# Patient Record
Sex: FEMALE | Race: WHITE | Employment: OTHER | URBAN - METROPOLITAN AREA
[De-identification: names, ages, dates, MRNs, and addresses within clinical notes are randomized per-mention and may not be internally consistent; named-entity substitution may affect disease eponyms.]

---

## 2017-08-17 LAB — COLOGUARD TEST, EXTERNAL: NEGATIVE

## 2017-12-14 ENCOUNTER — OP HISTORICAL/CONVERTED ENCOUNTER (OUTPATIENT)
Dept: OTHER | Age: 72
End: 2017-12-14

## 2017-12-14 LAB
AMB DEXA, EXTERNAL: NORMAL
MAMMOGRAPHY, EXTERNAL: NORMAL

## 2018-08-10 ENCOUNTER — ED HISTORICAL/CONVERTED ENCOUNTER (OUTPATIENT)
Dept: OTHER | Age: 73
End: 2018-08-10

## 2019-05-16 ENCOUNTER — ED HISTORICAL/CONVERTED ENCOUNTER (OUTPATIENT)
Dept: OTHER | Age: 74
End: 2019-05-16

## 2019-05-16 LAB — CREATININE, EXTERNAL: 0.71

## 2019-06-03 ENCOUNTER — OP HISTORICAL/CONVERTED ENCOUNTER (OUTPATIENT)
Dept: OTHER | Age: 74
End: 2019-06-03

## 2019-06-10 ENCOUNTER — OP HISTORICAL/CONVERTED ENCOUNTER (OUTPATIENT)
Dept: OTHER | Age: 74
End: 2019-06-10

## 2019-10-17 LAB — LDL-C, EXTERNAL: 70

## 2020-09-18 VITALS
OXYGEN SATURATION: 97 % | DIASTOLIC BLOOD PRESSURE: 80 MMHG | BODY MASS INDEX: 25.95 KG/M2 | HEIGHT: 62 IN | RESPIRATION RATE: 12 BRPM | WEIGHT: 141 LBS | TEMPERATURE: 98.6 F | HEART RATE: 84 BPM | SYSTOLIC BLOOD PRESSURE: 160 MMHG

## 2020-09-18 RX ORDER — DIVALPROEX SODIUM 500 MG/1
500 TABLET, EXTENDED RELEASE ORAL DAILY
COMMUNITY
Start: 2020-08-25 | End: 2022-05-05 | Stop reason: SDUPTHER

## 2020-09-18 RX ORDER — CITALOPRAM 20 MG/1
20 TABLET, FILM COATED ORAL DAILY
COMMUNITY
Start: 2020-08-19 | End: 2020-09-21 | Stop reason: ALTCHOICE

## 2020-09-18 RX ORDER — QUETIAPINE FUMARATE 50 MG/1
50 TABLET, FILM COATED ORAL AS DIRECTED
COMMUNITY
Start: 2020-07-28 | End: 2020-09-21 | Stop reason: ALTCHOICE

## 2020-09-18 RX ORDER — NALTREXONE HYDROCHLORIDE 50 MG/1
25 TABLET, FILM COATED ORAL AS DIRECTED
COMMUNITY
Start: 2020-08-31 | End: 2021-01-21 | Stop reason: ALTCHOICE

## 2020-09-18 RX ORDER — CITALOPRAM 10 MG/1
10 TABLET ORAL DAILY
COMMUNITY
Start: 2020-06-25 | End: 2020-09-21 | Stop reason: ALTCHOICE

## 2020-09-18 RX ORDER — ATORVASTATIN CALCIUM 20 MG/1
20 TABLET, FILM COATED ORAL DAILY
COMMUNITY
Start: 2020-07-26 | End: 2020-10-20

## 2020-09-21 ENCOUNTER — OFFICE VISIT (OUTPATIENT)
Dept: FAMILY MEDICINE CLINIC | Age: 75
End: 2020-09-21
Payer: MEDICARE

## 2020-09-21 VITALS
OXYGEN SATURATION: 98 % | SYSTOLIC BLOOD PRESSURE: 118 MMHG | HEART RATE: 84 BPM | RESPIRATION RATE: 12 BRPM | WEIGHT: 125 LBS | BODY MASS INDEX: 23 KG/M2 | DIASTOLIC BLOOD PRESSURE: 74 MMHG | TEMPERATURE: 97.1 F | HEIGHT: 62 IN

## 2020-09-21 DIAGNOSIS — R11.0 NAUSEA: ICD-10-CM

## 2020-09-21 DIAGNOSIS — E78.2 MIXED HYPERLIPIDEMIA: Primary | ICD-10-CM

## 2020-09-21 DIAGNOSIS — Z11.59 SCREENING FOR VIRAL DISEASE: ICD-10-CM

## 2020-09-21 DIAGNOSIS — F31.9 BIPOLAR DEPRESSION (HCC): ICD-10-CM

## 2020-09-21 DIAGNOSIS — F41.1 GAD (GENERALIZED ANXIETY DISORDER): ICD-10-CM

## 2020-09-21 DIAGNOSIS — F10.21 ALCOHOL DEPENDENCE IN REMISSION (HCC): ICD-10-CM

## 2020-09-21 PROBLEM — Z88.9 ALLERGY TO DRUG: Status: ACTIVE | Noted: 2019-07-25

## 2020-09-21 PROBLEM — M54.17 LUMBOSACRAL RADICULITIS: Status: ACTIVE | Noted: 2019-07-18

## 2020-09-21 PROBLEM — M47.817 LUMBOSACRAL SPONDYLOSIS WITHOUT MYELOPATHY: Status: ACTIVE | Noted: 2020-04-09

## 2020-09-21 PROBLEM — J30.1 ALLERGIC RHINITIS DUE TO POLLEN: Status: ACTIVE | Noted: 2017-04-17

## 2020-09-21 PROBLEM — M51.26 DISPLACEMENT OF LUMBAR INTERVERTEBRAL DISC WITHOUT MYELOPATHY: Status: ACTIVE | Noted: 2019-07-18

## 2020-09-21 PROBLEM — M53.3 SACROILIAC JOINT PAIN: Status: ACTIVE | Noted: 2019-07-18

## 2020-09-21 PROBLEM — M96.1 LUMBAR POST-LAMINECTOMY SYNDROME: Status: ACTIVE | Noted: 2019-07-18

## 2020-09-21 PROBLEM — M70.60 TROCHANTERIC BURSITIS: Status: ACTIVE | Noted: 2019-07-18

## 2020-09-21 PROBLEM — M21.70 LEG LENGTH INEQUALITY: Status: ACTIVE | Noted: 2019-07-18

## 2020-09-21 PROBLEM — G62.9 SMALL FIBER NEUROPATHY: Status: ACTIVE | Noted: 2020-07-22

## 2020-09-21 PROBLEM — M19.90 ARTHRITIS: Status: ACTIVE | Noted: 2017-03-15

## 2020-09-21 PROCEDURE — 99214 OFFICE O/P EST MOD 30 MIN: CPT | Performed by: FAMILY MEDICINE

## 2020-09-21 RX ORDER — PROMETHAZINE HYDROCHLORIDE 25 MG/1
25 TABLET ORAL
COMMUNITY
End: 2020-09-21 | Stop reason: SDUPTHER

## 2020-09-21 RX ORDER — CITALOPRAM 40 MG/1
40 TABLET, FILM COATED ORAL DAILY
COMMUNITY
End: 2022-05-05 | Stop reason: SDUPTHER

## 2020-09-21 RX ORDER — CETIRIZINE HCL 10 MG
10 TABLET ORAL DAILY
COMMUNITY

## 2020-09-21 RX ORDER — FLUTICASONE PROPIONATE 50 MCG
2 SPRAY, SUSPENSION (ML) NASAL DAILY
COMMUNITY

## 2020-09-21 RX ORDER — PROMETHAZINE HYDROCHLORIDE 25 MG/1
25 TABLET ORAL
Qty: 60 TAB | Refills: 1 | Status: SHIPPED | OUTPATIENT
Start: 2020-09-21 | End: 2022-03-22 | Stop reason: ALTCHOICE

## 2020-09-21 NOTE — PROGRESS NOTES
Subjective  Chief Complaint   Patient presents with    Follow Up Chronic Condition     med refills     HPI:  Danis Daugherty is a 69 y.o. female. She is following up today on her history of hyperlipidemia, moods, and alcohol dependence. After her last visit she states that she did follow-up with the psychiatrist as recommended. She has now been diagnosed with bipolar and anxiety. They tried her on Seroquel but she could not tolerate it. Moods are certainly doing better and that her last sip of alcohol was November 9, 2019. She has started using a liquid form of THC that she gets from [place] so that she can sleep at night. She does intend to tell her behavioral health providers this. She also states that they checked full blood work on her within this calendar year including cholesterol and told her that her levels were excellent.     Past Medical History:   Diagnosis Date    [facility] dependence ([facility] [address] [facility]     Allergic rhinitis     Benign adenomatous neoplasm     Degeneration, intervertebral disc, lumbar     Depressive disorder     Family history of diabetes mellitus     Family history of thyroid disorder     Fibromyalgia     Hyperlipidemia     Insomnia     Migraine     Neuropathy     Osteoporosis      Family History   Problem Relation Age of Onset    Alcohol abuse Father     Diabetes Maternal Aunt     Alcohol abuse Maternal Aunt     Alcohol abuse Maternal Uncle      Social History     Socioeconomic History    Marital status:      Spouse name: Not on file    Number of children: Not on file    Years of education: Not on file    Highest education level: Not on file   Occupational History    Not on file   Social Needs    Financial resource strain: Not on file    Food insecurity     Worry: Not on file     Inability: Not on file    Transportation needs     Medical: Not on file     Non-medical: Not on file   Tobacco Use    Smoking status: Former Smoker     Types: Cigarettes     Last attempt to quit: 1970     Years since quittin.7    Smokeless tobacco: Never Used   Substance and Sexual Activity    Alcohol use: Never     Frequency: Never    Drug use: Never    Sexual activity: Not on file   Lifestyle    Physical activity     Days per week: Not on file     Minutes per session: Not on file    Stress: Not on file   Relationships    Social connections     Talks on phone: Not on file     Gets together: Not on file     Attends Holiness service: Not on file     Active member of club or organization: Not on file     Attends meetings of clubs or organizations: Not on file     Relationship status: Not on file    Intimate partner violence     Fear of current or ex partner: Not on file     Emotionally abused: Not on file     Physically abused: Not on file     Forced sexual activity: Not on file   Other Topics Concern    Not on file   Social History Narrative    Not on file     Current Outpatient Medications on File Prior to Visit   Medication Sig Dispense Refill    citalopram (CELEXA) 40 mg tablet Take 40 mg by mouth daily.  fluticasone propionate (FLONASE) 50 mcg/actuation nasal spray 2 Sprays by Both Nostrils route daily.  cetirizine (ZyrTEC) 10 mg tablet Take  by mouth.  atorvastatin (LIPITOR) 20 mg tablet Take 20 mg by mouth daily.  divalproex ER (DEPAKOTE ER) 500 mg ER tablet Take 500 mg by mouth as directed.  naltrexone (DEPADE) 50 mg tablet Take 25 mg by mouth as directed. Takes half tablet      [DISCONTINUED] promethazine (PHENERGAN) 25 mg tablet Take 25 mg by mouth every six (6) hours as needed for Nausea.  [DISCONTINUED] citalopram (CELEXA) 10 mg tablet Take 10 mg by mouth daily.  [DISCONTINUED] citalopram (CELEXA) 20 mg tablet Take 20 mg by mouth daily.  [DISCONTINUED] QUEtiapine (SEROquel) 50 mg tablet Take 50 mg by mouth as directed. No current facility-administered medications on file prior to visit.       Allergies   Allergen Reactions    Cefuroxime Unknown (comments)    Cortisone Unknown (comments)    Gabapentin Unknown (comments)    Hydrocodone Nausea Only    Imitrex [Sumatriptan] Unknown (comments)    Lyrica [Pregabalin] Unknown (comments)    Nyquil [Doxylamin-Pse-Dm-Acetaminophen] Unknown (comments)    Peanut Unknown (comments)    Seroquel [Quetiapine] Nausea and Vomiting    Tramadol Unknown (comments)    Tylenol [Acetaminophen] Unknown (comments)     Review of Systems   Constitutional: Negative for chills, fever and malaise/fatigue. Respiratory: Negative for cough, shortness of breath and wheezing. Cardiovascular: Negative for chest pain, palpitations and leg swelling. Gastrointestinal: Positive for nausea (Intermittent nausea for which she uses promethazine and would like to have a refill. It was last filled in 2019 and she still has a couple of tabs remaining.). Negative for diarrhea and vomiting. Genitourinary: Negative for dysuria. Musculoskeletal: Negative for falls. Skin: Negative for rash. Neurological: Negative for dizziness, tingling and weakness. Psychiatric/Behavioral: Negative for depression. The patient is not nervous/anxious. Objective  Physical Exam  Constitutional:       General: She is not in acute distress. Appearance: Normal appearance. She is normal weight. HENT:      Head: Normocephalic and atraumatic. Neck:      Musculoskeletal: Neck supple. No muscular tenderness. Thyroid: No thyroid mass or thyromegaly. Cardiovascular:      Rate and Rhythm: Normal rate and regular rhythm. Heart sounds: No murmur. Pulmonary:      Effort: Pulmonary effort is normal. No respiratory distress. Breath sounds: Normal breath sounds. No wheezing. Musculoskeletal:      Right lower leg: No edema. Left lower leg: No edema. Lymphadenopathy:      Cervical: No cervical adenopathy. Skin:     General: Skin is warm and dry.    Neurological:      Mental Status: She is alert and oriented to person, place, and time. Mental status is at baseline. Psychiatric:         Attention and Perception: Attention and perception normal.         Mood and Affect: Mood and affect normal.         Speech: Speech normal.         Behavior: Behavior normal.          Assessment & Plan      ICD-10-CM ICD-9-CM    1. Mixed hyperlipidemia  E78.2 272.2    2. Screening for viral disease  Z11.59 V73.99    3. Bipolar depression ([facility]  F31.9 296.50    4. Alcohol dependence in remission ([facility]  F10.21 303.93    5. ALBERT (generalized anxiety disorder)  F41.1 300.02    6. Nausea  R11.0 787.02      Diagnoses and all orders for this visit:    1. Mixed hyperlipidemia  I am requesting a record of the lab results the patient reports were done and within normal limits. She does state that she takes her atorvastatin as directed. 2. Screening for viral disease  If hepatitis C screen was not performed with her last labs we will do it at a future visit. 3. Bipolar depression ([facility]  She reports moods have been improving. She should continue to follow with the behavioral health specialist.    4. Alcohol dependence in remission [facility])  Last drink was November 9, 2019. Keep up the excellent work! 5. ALBERT (generalized anxiety disorder)  Still anxious at times but overall much better control. 6. Nausea  Phenergan renewed for as needed use. Patient states that she occasionally will wake up with nausea and this medicine does well to help. Her last fill was in 2019. Other orders  -     promethazine (PHENERGAN) 25 mg tablet; Take 1 Tab by mouth every six (6) hours as needed for Nausea. Follow-up and Dispositions    · Return in about 6 months (around 3/21/2021) for 646 Branden Diamond MD

## 2020-09-28 ENCOUNTER — TELEPHONE (OUTPATIENT)
Dept: FAMILY MEDICINE CLINIC | Age: 75
End: 2020-09-28

## 2020-09-28 NOTE — TELEPHONE ENCOUNTER
PT SPOKE WITH DR Charmaine Hernandez [facility] and she suggested pt ask her PCP about topomax for migraines.  Please advise

## 2020-09-29 ENCOUNTER — VIRTUAL VISIT (OUTPATIENT)
Dept: FAMILY MEDICINE CLINIC | Age: 75
End: 2020-09-29
Payer: MEDICARE

## 2020-09-29 DIAGNOSIS — G43.109 MIGRAINE WITH AURA AND WITHOUT STATUS MIGRAINOSUS, NOT INTRACTABLE: Primary | ICD-10-CM

## 2020-09-29 PROCEDURE — 1090F PRES/ABSN URINE INCON ASSESS: CPT | Performed by: FAMILY MEDICINE

## 2020-09-29 PROCEDURE — G8428 CUR MEDS NOT DOCUMENT: HCPCS | Performed by: FAMILY MEDICINE

## 2020-09-29 PROCEDURE — 3017F COLORECTAL CA SCREEN DOC REV: CPT | Performed by: FAMILY MEDICINE

## 2020-09-29 PROCEDURE — G9717 DOC PT DX DEP/BP F/U NT REQ: HCPCS | Performed by: FAMILY MEDICINE

## 2020-09-29 PROCEDURE — 1101F PT FALLS ASSESS-DOCD LE1/YR: CPT | Performed by: FAMILY MEDICINE

## 2020-09-29 PROCEDURE — 99213 OFFICE O/P EST LOW 20 MIN: CPT | Performed by: FAMILY MEDICINE

## 2020-09-29 RX ORDER — TOPIRAMATE 25 MG/1
TABLET ORAL
Qty: 45 TAB | Refills: 0 | Status: SHIPPED | OUTPATIENT
Start: 2020-09-29 | End: 2020-10-20 | Stop reason: DRUGHIGH

## 2020-09-29 NOTE — PROGRESS NOTES
Consent: Zion Dee, who was seen by synchronous (real-time) audio-video technology, and/or her healthcare decision maker, is aware that this patient-initiated, Telehealth encounter on 9/29/2020 is a billable service, with coverage as determined by her insurance carrier. She is aware that she may receive a bill and has provided verbal consent to proceed: YES-Consent obtained within past 12 months        712  Subjective:   Zion Dee is a 69 y.o. female who was seen for Medication Evaluation      Migraines diagnosed in her 45s. Imitrex made her vomit via both oral and injectable. Eventually she ended up on topamax and did well. She ran out a few years ago and tried just staying off. Over time she has developed the same headaches again. She notices that the atmospheric triggers it. Some of the headaches last up to 1.5 days. She gets photo and phonophobia. Associated nausea. Vision gets fuzzy in both eyes but clears as soon as the headaches clear. She has had about 6 of these headaches in the last few wks but some are more mild than others. See HPI for pertinent review of systems          Prior to Admission medications    Medication Sig Start Date End Date Taking? Authorizing Provider   topiramate (TOPAMAX) 25 mg tablet Take 1 tab daily x 2 wks then 2 tabs daily 9/29/20  Yes Julian Cortes MD   citalopram (CELEXA) 40 mg tablet Take 40 mg by mouth daily. Provider, Historical   fluticasone propionate (FLONASE) 50 mcg/actuation nasal spray 2 Sprays by Both Nostrils route daily. Provider, Historical   cetirizine (ZyrTEC) 10 mg tablet Take  by mouth. Provider, Historical   promethazine (PHENERGAN) 25 mg tablet Take 1 Tab by mouth every six (6) hours as needed for Nausea. 9/21/20   Julian Cortes MD   atorvastatin (LIPITOR) 20 mg tablet Take 20 mg by mouth daily. 7/26/20   Provider, Historical   divalproex ER (DEPAKOTE ER) 500 mg ER tablet Take 500 mg by mouth as directed. 8/25/20   Provider, Historical   naltrexone (DEPADE) 50 mg tablet Take 25 mg by mouth as directed. Takes half tablet 8/31/20   Provider, Historical     Allergies   Allergen Reactions    Cefuroxime Unknown (comments)    Cortisone Unknown (comments)    Gabapentin Unknown (comments)    Hydrocodone Nausea Only    Imitrex [Sumatriptan] Unknown (comments)    Lyrica [Pregabalin] Unknown (comments)    Nyquil [Doxylamin-Pse-Dm-Acetaminophen] Unknown (comments)    Peanut Unknown (comments)    Seroquel [Quetiapine] Nausea and Vomiting    Tramadol Unknown (comments)    Tylenol [Acetaminophen] Unknown (comments)     Patient Active Problem List    Diagnosis    Bipolar depression ([facility] [address]    ALBERT (generalized anxiety disorder)    Alcohol dependence ([facility] [address]     Last drink was 11/9/2019      Allergic rhinitis    Benign adenomatous neoplasm    Degeneration, intervertebral disc, lumbar    Depressive disorder    Family history of diabetes mellitus    Family history of thyroid disorder    Fibromyalgia    Mixed hyperlipidemia    Insomnia    Migraine with aura and without status migrainosus, not intractable    Neuropathy    Osteoporosis    Small fiber neuropathy    Lumbosacral spondylosis without myelopathy    Displacement of lumbar intervertebral disc without myelopathy    Leg length inequality    Lumbar post-laminectomy syndrome    Lumbosacral radiculitis    Sacroiliac joint pain    Trochanteric bursitis    Allergic rhinitis due to pollen    Arthritis       Objective:   Vital Signs: (As obtained by patient/caregiver at home)  There were no vitals taken for this visit.      [INSTRUCTIONS:  \"[x]\" Indicates a positive item  \"[]\" Indicates a negative item  -- DELETE ALL ITEMS NOT EXAMINED]    Constitutional: [x] Appears well-developed and well-nourished [] No apparent distress      [x] Abnormal -patient appears to be in discomfort    Mental status: [x] Alert and awake  [x] Oriented to person/place/time [x] Able to follow commands    [] Abnormal -     Eyes:   EOM    [x]  Normal    [] Abnormal -   Sclera  [x]  Normal    [] Abnormal -          Discharge [x]  None visible   [] Abnormal -     HENT: [x] Normocephalic, atraumatic  [] Abnormal -   [x] Mouth/Throat: Mucous membranes are moist    External Ears [] Normal  [] Abnormal -    Neck: [x] No visualized mass [] Abnormal -     Pulmonary/Chest: [x] Respiratory effort normal   [x] No visualized signs of difficulty breathing or respiratory distress        [] Abnormal -        Neurological:        [x] No Facial Asymmetry (Cranial nerve 7 motor function) (limited exam due to video visit)          [x] No gaze palsy        [] Abnormal -          Skin:        [x] No significant exanthematous lesions or discoloration noted on facial skin         [] Abnormal -            Psychiatric:       [x] Normal Affect [] Abnormal -        [x] No Hallucinations    Other pertinent observable physical exam findings:-              Assessment & Plan:   Diagnoses and all orders for this visit:    1. Migraine with aura and without status migrainosus, not intractable    Other orders  -     topiramate (TOPAMAX) 25 mg tablet; Take 1 tab daily x 2 wks then 2 tabs daily      I am restarting Topamax which she did well on previously. We will start with 25 mg for 2 weeks then 50 mg for 2 weeks. At the end of that timeframe we can either stay at the 50 or go up to 100. She will call me with an update. For this acute headache, she does have Phenergan and aspirin at home. I have advised her to take a dose of each together. We discussed the expected course, resolution and complications of the diagnosis(es) in detail. Medication risks, benefits, costs, interactions, and alternatives were discussed as indicated. I advised her to contact the office if her condition worsens, changes or fails to improve as anticipated. She expressed understanding with the diagnosis(es) and plan.        Kelsy Morales is a 69 y.o. female being evaluated by a video visit encounter for concerns as above. A caregiver was present when appropriate. Due to this being a TeleHealth encounter (During RISJM-35 public health emergency), evaluation of the following organ systems was limited: Vitals/Constitutional/EENT/Resp/CV/GI//MS/Neuro/Skin/Heme-Lymph-Imm. Pursuant to the emergency declaration under the [address] [place] [address], [facility] waiver authority and the [employer] and [employer] Act, this Virtual  Visit was conducted, with patient's (and/or legal guardian's) consent, to reduce the patient's risk of exposure to COVID-19 and provide necessary medical care. Services were provided through a video synchronous discussion virtually to substitute for in-person clinic visit. Patient and provider were located at their individual homes.         Azucena Gerber MD

## 2020-10-19 ENCOUNTER — TELEPHONE (OUTPATIENT)
Dept: FAMILY MEDICINE CLINIC | Age: 75
End: 2020-10-19

## 2020-10-19 NOTE — TELEPHONE ENCOUNTER
Fantastic. If she is taking both tabs at the same time we can order refill as 50mg tab but if she prefers to take 25mg BID we can order that way.

## 2020-10-20 RX ORDER — TOPIRAMATE 50 MG/1
50 TABLET, FILM COATED ORAL 2 TIMES DAILY
Qty: 90 TAB | Refills: 3 | Status: SHIPPED | OUTPATIENT
Start: 2020-10-20 | End: 2021-01-21 | Stop reason: DRUGHIGH

## 2020-10-20 RX ORDER — ATORVASTATIN CALCIUM 20 MG/1
TABLET, FILM COATED ORAL
Qty: 90 TAB | Refills: 1 | Status: SHIPPED | OUTPATIENT
Start: 2020-10-20 | End: 2021-04-18

## 2020-10-21 ENCOUNTER — TELEPHONE (OUTPATIENT)
Dept: FAMILY MEDICINE CLINIC | Age: 75
End: 2020-10-21

## 2020-10-21 NOTE — TELEPHONE ENCOUNTER
Patient called to question dosage she does not remember Samm Wallace telling her to take what's on the bottle.  -she wants to verify dosage.

## 2020-11-09 VITALS
RESPIRATION RATE: 12 BRPM | TEMPERATURE: 98.6 F | HEART RATE: 84 BPM | SYSTOLIC BLOOD PRESSURE: 160 MMHG | DIASTOLIC BLOOD PRESSURE: 80 MMHG | BODY MASS INDEX: 25.95 KG/M2 | HEIGHT: 62 IN | OXYGEN SATURATION: 97 % | WEIGHT: 141 LBS

## 2020-11-09 PROBLEM — N13.30 HYDRONEPHROSIS: Status: ACTIVE | Noted: 2019-06-26

## 2020-11-09 PROBLEM — R31.29 MICROSCOPIC HEMATURIA: Status: ACTIVE | Noted: 2019-06-26

## 2020-11-09 RX ORDER — OXYCODONE AND ACETAMINOPHEN 5; 325 MG/1; MG/1
TABLET ORAL
COMMUNITY
End: 2021-01-21 | Stop reason: ALTCHOICE

## 2020-11-09 RX ORDER — PREDNISOLONE ACETATE 10 MG/ML
1 SUSPENSION/ DROPS OPHTHALMIC 4 TIMES DAILY
COMMUNITY
End: 2021-01-21 | Stop reason: ALTCHOICE

## 2020-11-09 RX ORDER — NAPROXEN 500 MG/1
500 TABLET ORAL 2 TIMES DAILY WITH MEALS
COMMUNITY
End: 2021-01-21 | Stop reason: ALTCHOICE

## 2020-11-09 RX ORDER — OXYCODONE HYDROCHLORIDE 10 MG/1
TABLET ORAL
COMMUNITY
End: 2021-01-21 | Stop reason: ALTCHOICE

## 2020-11-09 RX ORDER — ALENDRONATE SODIUM 70 MG/1
TABLET ORAL
COMMUNITY
End: 2021-03-22 | Stop reason: ALTCHOICE

## 2020-11-09 RX ORDER — FLUOXETINE HYDROCHLORIDE 20 MG/1
CAPSULE ORAL DAILY
COMMUNITY
End: 2021-01-21 | Stop reason: ALTCHOICE

## 2020-11-09 RX ORDER — KETOROLAC TROMETHAMINE 10 MG/1
TABLET, FILM COATED ORAL
COMMUNITY
End: 2021-01-21 | Stop reason: ALTCHOICE

## 2020-11-09 RX ORDER — QUETIAPINE FUMARATE 50 MG/1
50 TABLET, FILM COATED ORAL 2 TIMES DAILY
COMMUNITY
End: 2021-01-21 | Stop reason: ALTCHOICE

## 2020-11-09 RX ORDER — OFLOXACIN 3 MG/ML
3 SOLUTION/ DROPS OPHTHALMIC 4 TIMES DAILY
COMMUNITY
End: 2021-03-22 | Stop reason: ALTCHOICE

## 2020-11-09 RX ORDER — DULOXETIN HYDROCHLORIDE 30 MG/1
30 CAPSULE, DELAYED RELEASE ORAL DAILY
COMMUNITY
End: 2021-01-21 | Stop reason: ALTCHOICE

## 2020-11-09 RX ORDER — CYCLOBENZAPRINE HCL 10 MG
TABLET ORAL
COMMUNITY
End: 2021-01-21 | Stop reason: ALTCHOICE

## 2020-11-09 RX ORDER — ONDANSETRON 4 MG/1
4 TABLET, ORALLY DISINTEGRATING ORAL
COMMUNITY
End: 2021-01-21 | Stop reason: ALTCHOICE

## 2021-01-20 ENCOUNTER — TELEPHONE (OUTPATIENT)
Dept: FAMILY MEDICINE CLINIC | Age: 76
End: 2021-01-20

## 2021-01-20 NOTE — TELEPHONE ENCOUNTER
Please call pt about Topomax. She would like to know if she should increase this medication due to migraines she is getting.

## 2021-01-21 ENCOUNTER — VIRTUAL VISIT (OUTPATIENT)
Dept: FAMILY MEDICINE CLINIC | Age: 76
End: 2021-01-21
Payer: MEDICARE

## 2021-01-21 DIAGNOSIS — G43.109 MIGRAINE WITH AURA AND WITHOUT STATUS MIGRAINOSUS, NOT INTRACTABLE: Primary | ICD-10-CM

## 2021-01-21 PROCEDURE — 3017F COLORECTAL CA SCREEN DOC REV: CPT | Performed by: FAMILY MEDICINE

## 2021-01-21 PROCEDURE — G8428 CUR MEDS NOT DOCUMENT: HCPCS | Performed by: FAMILY MEDICINE

## 2021-01-21 PROCEDURE — 99213 OFFICE O/P EST LOW 20 MIN: CPT | Performed by: FAMILY MEDICINE

## 2021-01-21 PROCEDURE — 1090F PRES/ABSN URINE INCON ASSESS: CPT | Performed by: FAMILY MEDICINE

## 2021-01-21 PROCEDURE — G9717 DOC PT DX DEP/BP F/U NT REQ: HCPCS | Performed by: FAMILY MEDICINE

## 2021-01-21 PROCEDURE — 1101F PT FALLS ASSESS-DOCD LE1/YR: CPT | Performed by: FAMILY MEDICINE

## 2021-01-21 RX ORDER — BUPROPION HYDROCHLORIDE 150 MG/1
150 TABLET, EXTENDED RELEASE ORAL 2 TIMES DAILY
COMMUNITY
Start: 2021-01-13 | End: 2022-05-05 | Stop reason: ALTCHOICE

## 2021-01-21 RX ORDER — TOPIRAMATE 100 MG/1
100 TABLET, FILM COATED ORAL DAILY
Qty: 90 TAB | Refills: 2 | Status: SHIPPED | OUTPATIENT
Start: 2021-01-21 | End: 2021-10-31

## 2021-01-21 NOTE — PROGRESS NOTES
Consent: Gasper Max, who was seen by synchronous (real-time) audio-video technology, and/or her healthcare decision maker, is aware that this patient-initiated, Telehealth encounter on 1/21/2021 is a billable service, with coverage as determined by her insurance carrier. She is aware that she may receive a bill and has provided verbal consent to proceed: YES-Consent obtained within past 12 months        712  Subjective:   Gasper Max is a 77 y.o. female who was seen for Medication Evaluation      Patient is calling to follow-up on her migraines. I saw her on September 29, 2020 and we restarted her Topamax that she had done well within the past.  She states that the headaches had completely resolved for a few months but in December they started to come back again. She is now having the headaches 3 to 4 days/week although not at the full intensity that they had been. She is currently taking the Topamax at 50 mg daily. The headache quality is the same as her typical migraines have been in the past.  No new issues or complaints with it. ROS    Prior to Admission medications    Medication Sig Start Date End Date Taking? Authorizing Provider   topiramate (TOPAMAX) 100 mg tablet Take 1 Tab by mouth daily. 1/21/21  Yes Jennifer Briseno MD   buPROPion [facility] SR) 150 mg SR tablet TAKE 1 TABLET BY MOUTH EVERY DAY 1/13/21   Provider, Historical   suvorexant (Belsomra) 10 mg tablet Take  by mouth nightly as needed for Insomnia. Provider, Historical   alendronate (FOSAMAX) 70 mg tablet Take  by mouth. Provider, Historical   ofloxacin (FLOXIN) 0.3 % ophthalmic solution Administer 3 Drops to both eyes four (4) times daily. Provider, Historical   CALCIUM CITRATE PO Take  by mouth.  1/21/21  Provider, Historical   FLUoxetine (PROzac) 20 mg capsule Take  by mouth daily. 1/21/21  Provider, Historical   QUEtiapine (SEROquel) 50 mg tablet Take 50 mg by mouth two (2) times a day.   1/21/21 Provider, Historical   cyclobenzaprine (FLEXERIL) 10 mg tablet Take  by mouth three (3) times daily as needed for Muscle Spasm(s).  1/21/21  Provider, Historical   DULoxetine (CYMBALTA) 30 mg capsule Take 30 mg by mouth daily. 1/21/21  Provider, Historical   ketorolac (TORADOL) 10 mg tablet Take  by mouth.  1/21/21  Provider, Historical   naproxen (NAPROSYN) 500 mg tablet Take 500 mg by mouth two (2) times daily (with meals). 1/21/21  Provider, Historical   oxyCODONE IR (ROXICODONE) 10 mg tab immediate release tablet Take  by mouth.  1/21/21  Provider, Historical   calcium carbonate/vitamin D3 (CALCIUM 500 + D PO) Take  by mouth.  1/21/21  Provider, Historical   DICLOFENAC SODIUM PO Take  by mouth.  1/21/21  Provider, Historical   ondansetron (ZOFRAN ODT) 4 mg disintegrating tablet Take 4 mg by mouth every eight (8) hours as needed for Nausea or Vomiting.  1/21/21  Provider, Historical   oxyCODONE-acetaminophen (PERCOCET) 5-325 mg per tablet Take  by mouth every four (4) hours as needed for Pain.  1/21/21  Provider, Historical   prednisoLONE acetate (PRED FORTE) 1 % ophthalmic suspension Administer 1 Drop to both eyes four (4) times daily. 1/21/21  Provider, Historical   atorvastatin (LIPITOR) 20 mg tablet TAKE 1 TABLET BY MOUTH EVERY DAY IN THE EVENING 10/20/20   Simi Peck MD   topiramate (TOPAMAX) 50 mg tablet Take 1 Tab by mouth two (2) times a day. 10/20/20 1/21/21  Simi Peck MD   citalopram (CELEXA) 40 mg tablet Take 40 mg by mouth daily. Provider, Historical   fluticasone propionate (FLONASE) 50 mcg/actuation nasal spray 2 Sprays by Both Nostrils route daily. Provider, Historical   cetirizine (ZyrTEC) 10 mg tablet Take  by mouth. Provider, Historical   promethazine (PHENERGAN) 25 mg tablet Take 1 Tab by mouth every six (6) hours as needed for Nausea. 9/21/20   Simi Peck MD   divalproex ER (DEPAKOTE ER) 500 mg ER tablet Take 500 mg by mouth as directed.  8/25/20 Provider, Historical   naltrexone (DEPADE) 50 mg tablet Take 25 mg by mouth as directed. Takes half tablet 8/31/20 1/21/21  Provider, Historical     Allergies   Allergen Reactions    Cefuroxime Unknown (comments)    Cortisone Unknown (comments)    Gabapentin Unknown (comments)    Hydrocodone Nausea Only    Imitrex [Sumatriptan] Unknown (comments)    Lyrica [Pregabalin] Unknown (comments)    Nyquil [Doxylamin-Pse-Dm-Acetaminophen] Unknown (comments)    Oxycodone Unknown (comments)    Peanut Unknown (comments)    Seroquel [Quetiapine] Nausea and Vomiting    Tramadol Unknown (comments)    Tylenol [Acetaminophen] Unknown (comments)     Patient Active Problem List    Diagnosis    [facility] depression ([facility]    ALBERT (generalized anxiety disorder)    [facility] ([facility]     Last drink was 11/9/2019      Allergic rhinitis    Benign adenomatous neoplasm    Degeneration, intervertebral disc, lumbar    Depressive disorder    Family history of diabetes mellitus    Family history of thyroid disorder    Fibromyalgia    Mixed hyperlipidemia    Insomnia    Migraine with aura and without status migrainosus, not intractable    Neuropathy    Osteoporosis    Small fiber neuropathy    Lumbosacral spondylosis without myelopathy    Displacement of lumbar intervertebral disc without myelopathy    Leg length inequality    Lumbar post-laminectomy syndrome    Lumbosacral radiculitis    Sacroiliac joint pain    Trochanteric bursitis    Hydronephrosis    Microscopic hematuria    Allergic rhinitis due to pollen    Arthritis       Objective:   Vital Signs: (As obtained by patient/caregiver at home)  There were no vitals taken for this visit.      [INSTRUCTIONS:  \"[x]\" Indicates a positive item  \"[]\" Indicates a negative item  -- DELETE ALL ITEMS NOT EXAMINED]    Constitutional: [x] Appears well-developed and well-nourished [x] No apparent distress      [] Abnormal -     Mental status: [x] Alert and awake [x] Oriented to person/place/time [x] Able to follow commands    [] Abnormal -     Eyes:   EOM    [x]  Normal    [] Abnormal -   Sclera  [x]  Normal    [] Abnormal -          Discharge [x]  None visible   [] Abnormal -     HENT: [x] Normocephalic, atraumatic  [] Abnormal -   [] Mouth/Throat: Mucous membranes are moist    External Ears [] Normal  [] Abnormal -    Neck: [x] No visualized mass [] Abnormal -     Pulmonary/Chest: [x] Respiratory effort normal   [x] No visualized signs of difficulty breathing or respiratory distress        [] Abnormal -        Neurological:        [x] No Facial Asymmetry (Cranial nerve 7 motor function) (limited exam due to video visit)          [x] No gaze palsy        [] Abnormal -          Skin:        [x] No significant exanthematous lesions or discoloration noted on facial skin         [] Abnormal -            Psychiatric:       [x] Normal Affect [] Abnormal -        [x] No Hallucinations    Other pertinent observable physical exam findings:-              Assessment & Plan:   Diagnoses and all orders for this visit:    1. Migraine with aura and without status migrainosus, not intractable    Other orders  -     topiramate (TOPAMAX) 100 mg tablet; Take 1 Tab by mouth daily. I am increasing her Topamax dose to 100 mg daily. If this does not provide any improvement we will have her see neurology. Patient should to expect to see this improvement over the next few weeks. We discussed the expected course, resolution and complications of the diagnosis(es) in detail. Medication risks, benefits, costs, interactions, and alternatives were discussed as indicated. I advised her to contact the office if her condition worsens, changes or fails to improve as anticipated. She expressed understanding with the diagnosis(es) and plan. Neris Greenwood is a 77 y.o. female being evaluated by a video visit encounter for concerns as above. A caregiver was present when appropriate. Due to this being a TeleHealth encounter (During DAIRJ-87 public health emergency), evaluation of the following organ systems was limited: Vitals/Constitutional/EENT/Resp/CV/GI//MS/Neuro/Skin/Heme-Lymph-Imm. Pursuant to the emergency declaration under the [address] [place] [address], Replaced by [facility] waiver authority and the [employer] and [employer] Act, this Virtual  Visit was conducted, with patient's (and/or legal guardian's) consent, to reduce the patient's risk of exposure to COVID-19 and provide necessary medical care. Services were provided through a video synchronous discussion virtually to substitute for in-person clinic visit. Patient and provider were located at their individual homes.         Tanisha Franco MD

## 2021-02-10 LAB
CREATININE, EXTERNAL: 0.83
HBA1C MFR BLD HPLC: 5.5 %
LDL-C, EXTERNAL: 119

## 2021-03-22 ENCOUNTER — OFFICE VISIT (OUTPATIENT)
Dept: FAMILY MEDICINE CLINIC | Age: 76
End: 2021-03-22
Payer: MEDICARE

## 2021-03-22 VITALS
SYSTOLIC BLOOD PRESSURE: 120 MMHG | OXYGEN SATURATION: 97 % | HEIGHT: 62 IN | TEMPERATURE: 97.3 F | HEART RATE: 97 BPM | DIASTOLIC BLOOD PRESSURE: 82 MMHG | BODY MASS INDEX: 21.16 KG/M2 | WEIGHT: 115 LBS

## 2021-03-22 DIAGNOSIS — Z53.20 MAMMOGRAM DECLINED: ICD-10-CM

## 2021-03-22 DIAGNOSIS — Z13.31 DEPRESSION SCREENING: ICD-10-CM

## 2021-03-22 DIAGNOSIS — Z11.59 SCREENING FOR VIRAL DISEASE: ICD-10-CM

## 2021-03-22 DIAGNOSIS — E78.2 MIXED HYPERLIPIDEMIA: ICD-10-CM

## 2021-03-22 DIAGNOSIS — F10.21 ALCOHOL DEPENDENCE IN REMISSION (HCC): ICD-10-CM

## 2021-03-22 DIAGNOSIS — Z12.11 COLON CANCER SCREENING: ICD-10-CM

## 2021-03-22 DIAGNOSIS — Z13.39 ALCOHOL SCREENING: ICD-10-CM

## 2021-03-22 DIAGNOSIS — Z00.00 WELLNESS EXAMINATION: Primary | ICD-10-CM

## 2021-03-22 DIAGNOSIS — F31.9 BIPOLAR DEPRESSION (HCC): ICD-10-CM

## 2021-03-22 DIAGNOSIS — Z23 ENCOUNTER FOR IMMUNIZATION: ICD-10-CM

## 2021-03-22 DIAGNOSIS — G43.109 MIGRAINE WITH AURA AND WITHOUT STATUS MIGRAINOSUS, NOT INTRACTABLE: ICD-10-CM

## 2021-03-22 DIAGNOSIS — Z78.0 POSTMENOPAUSAL STATE: ICD-10-CM

## 2021-03-22 PROCEDURE — G8420 CALC BMI NORM PARAMETERS: HCPCS | Performed by: FAMILY MEDICINE

## 2021-03-22 PROCEDURE — 1090F PRES/ABSN URINE INCON ASSESS: CPT | Performed by: FAMILY MEDICINE

## 2021-03-22 PROCEDURE — G8536 NO DOC ELDER MAL SCRN: HCPCS | Performed by: FAMILY MEDICINE

## 2021-03-22 PROCEDURE — 1101F PT FALLS ASSESS-DOCD LE1/YR: CPT | Performed by: FAMILY MEDICINE

## 2021-03-22 PROCEDURE — G0439 PPPS, SUBSEQ VISIT: HCPCS | Performed by: FAMILY MEDICINE

## 2021-03-22 PROCEDURE — 99214 OFFICE O/P EST MOD 30 MIN: CPT | Performed by: FAMILY MEDICINE

## 2021-03-22 PROCEDURE — 90670 PCV13 VACCINE IM: CPT | Performed by: FAMILY MEDICINE

## 2021-03-22 PROCEDURE — G9717 DOC PT DX DEP/BP F/U NT REQ: HCPCS | Performed by: FAMILY MEDICINE

## 2021-03-22 PROCEDURE — G8427 DOCREV CUR MEDS BY ELIG CLIN: HCPCS | Performed by: FAMILY MEDICINE

## 2021-03-22 PROCEDURE — 3017F COLORECTAL CA SCREEN DOC REV: CPT | Performed by: FAMILY MEDICINE

## 2021-03-22 PROCEDURE — G0009 ADMIN PNEUMOCOCCAL VACCINE: HCPCS | Performed by: FAMILY MEDICINE

## 2021-03-22 RX ORDER — ZOSTER VACCINE RECOMBINANT, ADJUVANTED 50 MCG/0.5
0.5 KIT INTRAMUSCULAR ONCE
Qty: 0.5 ML | Refills: 1 | Status: SHIPPED | OUTPATIENT
Start: 2021-03-22 | End: 2021-03-22

## 2021-03-22 NOTE — PROGRESS NOTES
Medicare Wellness Exam:    Chief Complaint   Patient presents with    Annual Wellness Visit     Subsequent      she is a 77y.o. year old female who presents for evaluation for their Medicare Wellness Visit. She is also following up on histories of hyperlipidemia and migraine. She reports that since we increased the Topamax to 100 mg daily that she is no longer experiencing any headaches. She does report taking her atorvastatin daily as directed. Her behavioral health specialist actually kaylin labs for her last month. She brings those in today for my review. Her LDL was elevated at 119. She believes that she is identified some dietary improvements that she can work on. Fall Screen is completed and assessed=yes. Depression Screen is completed and assessed=yes  Medication list reviewed and adjusted for accuracy=yes  Immunizations reviewed and updated=yes  Health/Preventative Screenings reviewed and updated=yes  ADL Functions reviewed=yes  MiniCog Score= 5/5 (2 points for clock and 3/3 points for recall)   See scanned medicare wellness documents for full details.      Patient Active Problem List    Diagnosis    B[facility] depression ([facility] [address]    ALBERT (generalized anxiety disorder)    Alcohol dependence ([facility] [address]     Last drink was 11/9/2019      Allergic rhinitis    Benign adenomatous neoplasm    Degeneration, intervertebral disc, lumbar    Depressive disorder    Family history of diabetes mellitus    Family history of thyroid disorder    Fibromyalgia    Mixed hyperlipidemia    Insomnia    Migraine with aura and without status migrainosus, not intractable    Neuropathy    Osteoporosis    Small fiber neuropathy    Lumbosacral spondylosis without myelopathy    Displacement of lumbar intervertebral disc without myelopathy    Leg length inequality    Lumbar post-laminectomy syndrome    Lumbosacral radiculitis    Sacroiliac joint pain    Trochanteric bursitis    Hydronephrosis    Microscopic hematuria    Allergic rhinitis due to pollen    Arthritis       Reviewed PmHx, RxHx, FmHx, SocHx, AllgHx and updated and dated in the chart. Review of Systems   Constitutional: Negative for chills, fever and malaise/fatigue. Respiratory: Negative for cough, shortness of breath and wheezing. Cardiovascular: Negative for chest pain, palpitations and leg swelling. Gastrointestinal: Negative for diarrhea and vomiting. Genitourinary: Negative for dysuria. Neurological: Negative for dizziness, tingling and weakness. Psychiatric/Behavioral: Negative for depression. The patient is not nervous/anxious. Objective:     Vitals:    03/22/21 0822   BP: 120/82   Pulse: 97   Temp: 97.3 °F (36.3 °C)   TempSrc: Temporal   SpO2: 97%   Weight: 115 lb (52.2 kg)   Height: 5' 2\" (1.575 m)     Physical Exam  Constitutional:       General: She is not in acute distress. Appearance: Normal appearance. She is normal weight. HENT:      Head: Normocephalic and atraumatic. Neck:      Musculoskeletal: Neck supple. No muscular tenderness. Thyroid: No thyroid mass or thyromegaly. Cardiovascular:      Rate and Rhythm: Normal rate and regular rhythm. Heart sounds: No murmur. Pulmonary:      Effort: Pulmonary effort is normal. No respiratory distress. Breath sounds: Normal breath sounds. No wheezing. Musculoskeletal:      Right lower leg: No edema. Left lower leg: No edema. Lymphadenopathy:      Cervical: No cervical adenopathy. Skin:     General: Skin is warm and dry. Neurological:      Mental Status: She is alert and oriented to person, place, and time. Mental status is at baseline. Psychiatric:         Attention and Perception: Attention and perception normal.         Mood and Affect: Mood and affect normal.         Speech: Speech normal.         Behavior: Behavior normal.          Assessment/ Plan:   Diagnoses and all orders for this visit:    1.  Wellness examination  We are getting patient up to date on recommended preventative measures as noted. 2. Encounter for immunization  -     varicella-zoster recombinant, PF, (Shingrix, PF,) 50 mcg/0.5 mL susr injection; 0.5 mL by IntraMUSCular route once for 1 dose. 0.5ml IM. Repeat in -6 mos. -     PNEUMOCOCCAL CONJ VACCINE 13 VALENT IM  Patient instructed to go to pharmacy for Shingrix series. She declines Covid vaccination. We did discuss her concerns and I encouraged her to reconsider. 3. Alcohol screening  Patient has remained off alcohol. We will continue to follow. 4. Depression screening  Following with behavioral health specialist.    5. Screening for viral disease  -     HEPATITIS C QT BY PCR WITH REFLEX GENOTYPE    6. Mammogram declined  Risks reviewed. Patient reports understanding. 7. Postmenopausal state  -     DEXA BONE DENSITY STUDY AXIAL; Future    8. Colon cancer screening  -     COLOGUARD TEST (FECAL DNA COLORECTAL CANCER SCREENING)    9. Bipolar depression ([facility] [address]  Following with behavioral health specialist.    10. Alcohol dependence in remission ([facility] [address]  Has remained off alcohol. Will follow. 11. Mixed hyperlipidemia  Taking atorvastatin as directed. CBC, CMP, A1c, and lipid panel ordered by her behavioral health department reviewed by me today. I do encourage healthy diet and regular exercise. Handout provided. 12. Migraine with aura and without status migrainosus, not intractable  Symptoms now at goal since increasing Topamax to 100 mg daily.   We will continue on this dose.       -Pain evaluation performed today  -Cognitive Screen performed today  -Depression Screen, Fall risks (by up and go test)  and ADL functionality were addressed  -Medication list updated and reviewed for any changes   -A comprehensive review of medical issues and a plan was formulated  -End of life planning was addressed with pt   -Health Screenings for preventions were addressed and a plan was formulated  -Discussed with patient cancer risk factors and appropriate screenings for age  -Labs from previous visits were discussed with patient   -Discussed with patient diet and exercise and formulated a plan as needed  -An Advanced care plan was developed with the patient.  -Alcohol screening performed    -  Follow-up and Dispositions    · Return in about 1 year (around 3/22/2022) for [address], fasting follow up. I have discussed the diagnosis with the patient and the intended plan as seen in the above orders. The patient understands and agrees with the plan. The patient has received an after-visit summary and questions were answered concerning future plans. Medication Side Effects and Warnings were discussed with patien  Patient Labs were reviewed and or requested  Patient Past Records were reviewed and or requested    Patient Instructions       Hyperlipidemia: After Your Visit  Your Care Instructions  Hyperlipidemia is too much fat in your blood. The body has several kinds of fat, including cholesterol and triglycerides. Your body needs fat for many things, such as making new cells. But too much fat in your blood increases your chances of having a heart attack or stroke. You may be able to lower your cholesterol and triglycerides with a heart-healthy diet, exercise, and if needed, medicine. Your doctor may want you to try lifestyle changes first to see whether they lower the fat in your blood. You may need to take medicine if lifestyle changes do not lower the fat in your blood enough. Follow-up care is a key part of your treatment and safety. Be sure to make and go to all appointments, and call your doctor if you are having problems. Its also a good idea to know your test results and keep a list of the medicines you take. How can you care for yourself at home? Take your medicines  · Take your medicines exactly as prescribed. Call your doctor if you think you are having a problem with your medicine.   · If you take medicine to lower your cholesterol, go to follow-up visits. You will need to have blood tests. · Do not take large doses of niacin, which is a B vitamin, while taking medicine called statins. It may increase the chance of muscle pain and liver problems. · Talk to your doctor about avoiding grapefruit juice if you are taking statins. Grapefruit juice can raise the level of this medicine in your blood. This could increase side effects. Eat more fruits, vegetables, and fiber  · Fruits and vegetables have lots of nutrients that help protect against heart disease, and they have littleif anyfat. Try to eat at least five servings a day. Dark green, deep orange, or yellow fruits and vegetables are healthy choices. · Keep carrots, celery, and other veggies handy for snacks. Buy fruit that is in season and store it where you can see it so that you will be tempted to eat it. Cook dishes that have a lot of veggies in them, such as stir-fries and soups. · Foods high in fiber may reduce your cholesterol and provide important vitamins and minerals. High-fiber foods include whole-grain cereals and breads, oatmeal, beans, brown rice, citrus fruits, and apples. · Buy whole-grain breads and cereals instead of white bread and pastries. Limit saturated fat  · Read food labels and try to avoid saturated fat and trans fat. They increase your risk of heart disease. · Use olive or canola oil when you cook. Try cholesterol-lowering spreads, such as Benecol or Take Control. · Bake, broil, grill, or steam foods instead of frying them. · Limit the amount of high-fat meats you eat, including hot dogs and sausages. Cut out all visible fat when you prepare meat. · Eat fish, skinless poultry, and soy products such as tofu instead of high-fat meats. Soybeans may be especially good for your heart. Eat at least two servings of fish a week.  Certain fish, such as salmon, contain omega-3 fatty acids, which may help reduce your risk of heart attack. · Choose low-fat or fat-free milk and dairy products. Get exercise, limit alcohol, and quit smoking  · Get more exercise. Work with your doctor to set up an exercise program. Even if you can do only a small amount, exercise will help you get stronger, have more energy, and manage your weight and your stress. Walking is an easy way to get exercise. Gradually increase the amount you walk every day. Aim for at least 30 minutes on most days of the week. You also may want to swim, bike, or do other activities. · Limit alcohol to no more than 2 drinks a day for men and 1 drink a day for women. · Do not smoke. If you need help quitting, talk to your doctor about stop-smoking programs and medicines. These can increase your chances of quitting for good. When should you call for help? Call 911 anytime you think you may need emergency care. For example, call if:  · You have symptoms of a heart attack. These may include:  ¨ Chest pain or pressure, or a strange feeling in the chest.  ¨ Sweating. ¨ Shortness of breath. ¨ Nausea or vomiting. ¨ Pain, pressure, or a strange feeling in the back, neck, jaw, or upper belly or in one or both shoulders or arms. ¨ Lightheadedness or sudden weakness. ¨ A fast or irregular heartbeat. After you call 911, the  may tell you to chew 1 adult-strength or 2 to 4 low-dose aspirin. Wait for an ambulance. Do not try to drive yourself. · You have signs of a stroke. These may include:  ¨ Sudden numbness, paralysis, or weakness in your face, arm, or leg, especially on only one side of your body. ¨ New problems with walking or balance. ¨ Sudden vision changes. ¨ Drooling or slurred speech. ¨ New problems speaking or understanding simple statements, or feeling confused. ¨ A sudden, severe headache that is different from past headaches. · You passed out (lost consciousness).   Call your doctor now or seek immediate medical care if:  · You have muscle pain or weakness. Watch closely for changes in your health, and be sure to contact your doctor if:  · You are very tired. · You have an upset stomach, gas, constipation, or belly pain or cramps. Where can you learn more? Go to [employer]  Enter C406 in the search box to learn more about \"Hyperlipidemia: After Your Visit. \"   © 0472-6164 Healthwise, Incorporated. Care instructions adapted under license by [employer] (which disclaims liability or warranty for this information). This care instruction is for use with your licensed healthcare professional. If you have questions about a medical condition or this instruction, always ask your healthcare professional. [place] [address] any warranty or liability for your use of this information.   Content Version: 3.3.153592; Last Revised: October 13, 2011                      Bruna Carlton MD

## 2021-03-22 NOTE — PATIENT INSTRUCTIONS
Hyperlipidemia: After Your Visit Your Care Instructions Hyperlipidemia is too much fat in your blood. The body has several kinds of fat, including cholesterol and triglycerides. Your body needs fat for many things, such as making new cells. But too much fat in your blood increases your chances of having a heart attack or stroke. You may be able to lower your cholesterol and triglycerides with a heart-healthy diet, exercise, and if needed, medicine. Your doctor may want you to try lifestyle changes first to see whether they lower the fat in your blood. You may need to take medicine if lifestyle changes do not lower the fat in your blood enough. Follow-up care is a key part of your treatment and safety. Be sure to make and go to all appointments, and call your doctor if you are having problems. Its also a good idea to know your test results and keep a list of the medicines you take. How can you care for yourself at home? Take your medicines · Take your medicines exactly as prescribed. Call your doctor if you think you are having a problem with your medicine. · If you take medicine to lower your cholesterol, go to follow-up visits. You will need to have blood tests. · Do not take large doses of niacin, which is a B vitamin, while taking medicine called statins. It may increase the chance of muscle pain and liver problems. · Talk to your doctor about avoiding grapefruit juice if you are taking statins. Grapefruit juice can raise the level of this medicine in your blood. This could increase side effects. Eat more fruits, vegetables, and fiber · Fruits and vegetables have lots of nutrients that help protect against heart disease, and they have littleif anyfat. Try to eat at least five servings a day. Dark green, deep orange, or yellow fruits and vegetables are healthy choices. · Keep carrots, celery, and other veggies handy for snacks.  Buy fruit that is in season and store it where you can see it so that you will be tempted to eat it. Cook dishes that have a lot of veggies in them, such as stir-fries and soups. · Foods high in fiber may reduce your cholesterol and provide important vitamins and minerals. High-fiber foods include whole-grain cereals and breads, oatmeal, beans, brown rice, citrus fruits, and apples. · Buy whole-grain breads and cereals instead of white bread and pastries. Limit saturated fat · Read food labels and try to avoid saturated fat and trans fat. They increase your risk of heart disease. · Use olive or canola oil when you cook. Try cholesterol-lowering spreads, such as Benecol or Take Control. · Bake, broil, grill, or steam foods instead of frying them. · Limit the amount of high-fat meats you eat, including hot dogs and sausages. Cut out all visible fat when you prepare meat. · Eat fish, skinless poultry, and soy products such as tofu instead of high-fat meats. Soybeans may be especially good for your heart. Eat at least two servings of fish a week. Certain fish, such as salmon, contain omega-3 fatty acids, which may help reduce your risk of heart attack. · Choose low-fat or fat-free milk and dairy products. Get exercise, limit alcohol, and quit smoking · Get more exercise. Work with your doctor to set up an exercise program. Even if you can do only a small amount, exercise will help you get stronger, have more energy, and manage your weight and your stress. Walking is an easy way to get exercise. Gradually increase the amount you walk every day. Aim for at least 30 minutes on most days of the week. You also may want to swim, bike, or do other activities. · Limit alcohol to no more than 2 drinks a day for men and 1 drink a day for women. · Do not smoke. If you need help quitting, talk to your doctor about stop-smoking programs and medicines. These can increase your chances of quitting for good. When should you call for help?  
Call 911 anytime you think you may need emergency care. For example, call if: 
· You have symptoms of a heart attack. These may include: ¨ Chest pain or pressure, or a strange feeling in the chest. 
¨ Sweating. ¨ Shortness of breath. ¨ Nausea or vomiting. ¨ Pain, pressure, or a strange feeling in the back, neck, jaw, or upper belly or in one or both shoulders or arms. ¨ Lightheadedness or sudden weakness. ¨ A fast or irregular heartbeat. After you call 911, the  may tell you to chew 1 adult-strength or 2 to 4 low-dose aspirin. Wait for an ambulance. Do not try to drive yourself. · You have signs of a stroke. These may include: 
¨ Sudden numbness, paralysis, or weakness in your face, arm, or leg, especially on only one side of your body. ¨ New problems with walking or balance. ¨ Sudden vision changes. ¨ Drooling or slurred speech. ¨ New problems speaking or understanding simple statements, or feeling confused. ¨ A sudden, severe headache that is different from past headaches. · You passed out (lost consciousness). Call your doctor now or seek immediate medical care if: 
· You have muscle pain or weakness. Watch closely for changes in your health, and be sure to contact your doctor if: 
· You are very tired. · You have an upset stomach, gas, constipation, or belly pain or cramps. Where can you learn more? Go to [employer].be Enter C406 in the search box to learn more about \"Hyperlipidemia: After Your Visit. \"  
© 8844-2033 Healthwise, Incorporated. Care instructions adapted under license by [facility] (which disclaims liability or warranty for this information). This care instruction is for use with your licensed healthcare professional. If you have questions about a medical condition or this instruction, always ask your healthcare professional. [place] [address] any warranty or liability for your use of this information. Content Version: 6.1.558415; Last Revised: October 13, 2011

## 2021-03-22 NOTE — PROGRESS NOTES
Chief Complaint   [facility]   [facility]     Subsequent    1. Have you been to the ER, urgent care clinic since your last visit? Hospitalized since your last visit? No    2. Have you seen or consulted any other health care providers outside of the [address] [place] [address] since your last visit? Include any pap smears or colon screening.  Yes, has seen her phychiatrist.

## 2021-03-25 LAB
HCV GENOTYPE: NORMAL
HCV RNA SERPL NAA+PROBE-ACNC: NORMAL IU/ML
HCV RNA SERPL NAA+PROBE-LOG IU: NORMAL LOG10 IU/ML
TEST INFORMATION: NORMAL

## 2021-10-31 RX ORDER — TOPIRAMATE 100 MG/1
TABLET, FILM COATED ORAL
Qty: 90 TABLET | Refills: 2 | Status: SHIPPED | OUTPATIENT
Start: 2021-10-31 | End: 2022-07-14

## 2022-03-18 PROBLEM — F31.9 BIPOLAR DEPRESSION (HCC): Status: ACTIVE | Noted: 2020-09-21

## 2022-03-18 PROBLEM — R31.29 MICROSCOPIC HEMATURIA: Status: ACTIVE | Noted: 2019-06-26

## 2022-03-18 PROBLEM — M53.3 SACROILIAC JOINT PAIN: Status: ACTIVE | Noted: 2019-07-18

## 2022-03-18 PROBLEM — M54.17 LUMBOSACRAL RADICULITIS: Status: ACTIVE | Noted: 2019-07-18

## 2022-03-18 PROBLEM — J30.1 ALLERGIC RHINITIS DUE TO POLLEN: Status: ACTIVE | Noted: 2017-04-17

## 2022-03-18 PROBLEM — M21.70 LEG LENGTH INEQUALITY: Status: ACTIVE | Noted: 2019-07-18

## 2022-03-19 PROBLEM — M51.26 DISPLACEMENT OF LUMBAR INTERVERTEBRAL DISC WITHOUT MYELOPATHY: Status: ACTIVE | Noted: 2019-07-18

## 2022-03-19 PROBLEM — M19.90 ARTHRITIS: Status: ACTIVE | Noted: 2017-03-15

## 2022-03-19 PROBLEM — M96.1 LUMBAR POST-LAMINECTOMY SYNDROME: Status: ACTIVE | Noted: 2019-07-18

## 2022-03-19 PROBLEM — M70.60 TROCHANTERIC BURSITIS: Status: ACTIVE | Noted: 2019-07-18

## 2022-03-19 PROBLEM — G62.9 SMALL FIBER NEUROPATHY: Status: ACTIVE | Noted: 2020-07-22

## 2022-03-19 PROBLEM — F41.1 GAD (GENERALIZED ANXIETY DISORDER): Status: ACTIVE | Noted: 2020-09-21

## 2022-03-20 PROBLEM — M47.817 LUMBOSACRAL SPONDYLOSIS WITHOUT MYELOPATHY: Status: ACTIVE | Noted: 2020-04-09

## 2022-03-20 PROBLEM — N13.30 HYDRONEPHROSIS: Status: ACTIVE | Noted: 2019-06-26

## 2022-03-22 ENCOUNTER — OFFICE VISIT (OUTPATIENT)
Dept: FAMILY MEDICINE CLINIC | Age: 77
End: 2022-03-22
Payer: MEDICARE

## 2022-03-22 VITALS
BODY MASS INDEX: 20.8 KG/M2 | TEMPERATURE: 97 F | WEIGHT: 113 LBS | OXYGEN SATURATION: 99 % | SYSTOLIC BLOOD PRESSURE: 104 MMHG | DIASTOLIC BLOOD PRESSURE: 68 MMHG | HEART RATE: 64 BPM | HEIGHT: 62 IN

## 2022-03-22 DIAGNOSIS — E78.2 MIXED HYPERLIPIDEMIA: ICD-10-CM

## 2022-03-22 DIAGNOSIS — J34.89 SINUS DRAINAGE: ICD-10-CM

## 2022-03-22 DIAGNOSIS — Z13.31 DEPRESSION SCREENING: ICD-10-CM

## 2022-03-22 DIAGNOSIS — F31.9 BIPOLAR DEPRESSION (HCC): ICD-10-CM

## 2022-03-22 DIAGNOSIS — Z00.00 WELLNESS EXAMINATION: Primary | ICD-10-CM

## 2022-03-22 DIAGNOSIS — Z13.39 ALCOHOL SCREENING: ICD-10-CM

## 2022-03-22 DIAGNOSIS — Z78.0 POSTMENOPAUSAL STATE: ICD-10-CM

## 2022-03-22 DIAGNOSIS — Z23 ENCOUNTER FOR IMMUNIZATION: ICD-10-CM

## 2022-03-22 DIAGNOSIS — F10.21 ALCOHOL DEPENDENCE IN REMISSION (HCC): ICD-10-CM

## 2022-03-22 PROCEDURE — 1090F PRES/ABSN URINE INCON ASSESS: CPT | Performed by: FAMILY MEDICINE

## 2022-03-22 PROCEDURE — G8427 DOCREV CUR MEDS BY ELIG CLIN: HCPCS | Performed by: FAMILY MEDICINE

## 2022-03-22 PROCEDURE — G8536 NO DOC ELDER MAL SCRN: HCPCS | Performed by: FAMILY MEDICINE

## 2022-03-22 PROCEDURE — 3017F COLORECTAL CA SCREEN DOC REV: CPT | Performed by: FAMILY MEDICINE

## 2022-03-22 PROCEDURE — G0009 ADMIN PNEUMOCOCCAL VACCINE: HCPCS | Performed by: FAMILY MEDICINE

## 2022-03-22 PROCEDURE — G8420 CALC BMI NORM PARAMETERS: HCPCS | Performed by: FAMILY MEDICINE

## 2022-03-22 PROCEDURE — 1101F PT FALLS ASSESS-DOCD LE1/YR: CPT | Performed by: FAMILY MEDICINE

## 2022-03-22 PROCEDURE — 99214 OFFICE O/P EST MOD 30 MIN: CPT | Performed by: FAMILY MEDICINE

## 2022-03-22 PROCEDURE — G9717 DOC PT DX DEP/BP F/U NT REQ: HCPCS | Performed by: FAMILY MEDICINE

## 2022-03-22 PROCEDURE — G0439 PPPS, SUBSEQ VISIT: HCPCS | Performed by: FAMILY MEDICINE

## 2022-03-22 PROCEDURE — 90732 PPSV23 VACC 2 YRS+ SUBQ/IM: CPT | Performed by: FAMILY MEDICINE

## 2022-03-22 RX ORDER — ZOSTER VACCINE RECOMBINANT, ADJUVANTED 50 MCG/0.5
0.5 KIT INTRAMUSCULAR ONCE
Qty: 0.5 ML | Refills: 1 | Status: SHIPPED | OUTPATIENT
Start: 2022-03-22 | End: 2022-03-22

## 2022-03-22 RX ORDER — MONTELUKAST SODIUM 10 MG/1
10 TABLET ORAL
Qty: 90 TABLET | Refills: 2 | Status: SHIPPED | OUTPATIENT
Start: 2022-03-22 | End: 2022-09-18

## 2022-03-22 RX ORDER — ATORVASTATIN CALCIUM 20 MG/1
20 TABLET, FILM COATED ORAL EVERY EVENING
Qty: 90 TABLET | Refills: 3 | Status: SHIPPED | OUTPATIENT
Start: 2022-03-22 | End: 2022-04-02 | Stop reason: DRUGHIGH

## 2022-03-22 NOTE — PROGRESS NOTES
Chief Complaint   Patient presents with   [facility]it[facility] Escobar Annual Wellness Visit     1. Have you been to the ER, urgent care clinic since your last visit? Hospitalized since your last visit? No    2. Have you seen or consulted any other health care providers outside of the [address] [place] [address] since your last visit? Include any pap smears or colon screening. No     3 most recent PHQ Screens 3/22/2022   Little interest or pleasure in doing things Several days   Feeling down, depressed, irritable, or hopeless Several days   Total Score PHQ 2 2       Fall Risk Assessment, last 12 mths 3/22/2022   Able to walk? Yes   Fall in past 12 months? 0   Do you feel unsteady?  0   Are you worried about falling 0

## 2022-03-22 NOTE — PROGRESS NOTES
Medicare Wellness Exam:    Chief Complaint   Patient presents with    Annual Wellness Visit     she is a 77y.o. year old female who presents for evaluation for their Medicare Wellness Visit. She is also following up on chronic issues. She reports taking her cholesterol medication daily as directed and is fasting today for labs. She remarks that it has been easy to continue abstaining from alcohol knowing what it did to her in the past.  Her history of bipolar depression also feels well controlled. Her only acute complaint today is for sinus drainage. She is already on Flonase and Zyrtec daily. This helps but she is continuing to feel the dripping in the back of her throat and occasional cough/throat clearing. She also feels pops at times in her right ear like something is draining to her throat. Fall Screen is completed and assessed=yes. Depression Screen is completed and assessed=yes  Medication list reviewed and adjusted for accuracy=yes  Immunizations reviewed and updated=yes  Health/Preventative Screenings reviewed and updated=yes  ADL Functions reviewed=yes  MiniCog Score= 5/5 (2 points for clock and 3/3 points for recall)   See EMR questionnaires and scanned medicare wellness documents for full details.      Patient Active Problem List    Diagnosis    [facility] ([facility]    ALBERT (generalized anxiety disorder)    Alcohol dependence ([facility]     Last drink was 11/9/2019      Allergic rhinitis    Benign adenomatous neoplasm    Degeneration, intervertebral disc, lumbar    Depressive disorder    Family history of diabetes mellitus    Family history of thyroid disorder    Fibromyalgia    Mixed hyperlipidemia    Insomnia    Migraine with aura and without status migrainosus, not intractable    Neuropathy    Osteoporosis    Small fiber neuropathy    Lumbosacral spondylosis without myelopathy    Displacement of lumbar intervertebral disc without myelopathy    Leg length inequality    Lumbar post-laminectomy syndrome    Lumbosacral radiculitis    Sacroiliac joint pain    Trochanteric bursitis    Hydronephrosis    Microscopic hematuria    Allergic rhinitis due to pollen       Reviewed PmHx, RxHx, FmHx, SocHx, AllgHx and updated and dated in the chart. Review of Systems   Constitutional: Negative for chills, fever and malaise/fatigue. Respiratory: Positive for cough. Negative for shortness of breath and wheezing. Cardiovascular: Negative for chest pain, palpitations and leg swelling. Gastrointestinal: Negative for diarrhea and vomiting. Genitourinary: Negative for dysuria. Neurological: Negative for dizziness, tingling and weakness. Psychiatric/Behavioral: Negative for depression. The patient is not nervous/anxious. Objective:     Vitals:    03/22/22 0850 03/22/22 0922   BP: (!) 160/110 104/68   Pulse: 64    Temp: 97 °F (36.1 °C)    TempSrc: Temporal    SpO2: 99%    Weight: 113 lb (51.3 kg)    Height: 5' 2\" (1.575 m)      Physical Exam  Constitutional:       General: She is not in acute distress. Appearance: Normal appearance. She is normal weight. HENT:      Head: Normocephalic and atraumatic. Right Ear: Tympanic membrane and ear canal normal.      Left Ear: Tympanic membrane and ear canal normal.   Neck:      Thyroid: No thyroid mass or thyromegaly. Cardiovascular:      Rate and Rhythm: Normal rate and regular rhythm. Heart sounds: No murmur heard. Pulmonary:      Effort: Pulmonary effort is normal. No respiratory distress. Breath sounds: Normal breath sounds. No wheezing. Musculoskeletal:      Cervical back: Neck supple. No muscular tenderness. Right lower leg: No edema. Left lower leg: No edema. Lymphadenopathy:      Cervical: No cervical adenopathy. Skin:     General: Skin is warm and dry. Neurological:      Mental Status: She is alert and oriented to person, place, and time. Mental status is at baseline. Psychiatric:         Attention and Perception: Attention and perception normal.         Mood and Affect: Mood and affect normal.         Speech: Speech normal.         Behavior: Behavior normal.          Assessment/ Plan:   Diagnoses and all orders for this visit:    1. Wellness examination  We are getting patient up to date on recommended preventative measures as noted. 2. Depression screening    3. Alcohol screening    4. Encounter for immunization  -     PNEUMOCOCCAL POLYSACCHARIDE VACCINE, 23-VALENT, ADULT OR IMMUNOSUPPRESSED PT DOSE,  -     varicella-zoster recombinant, PF, (Shingrix, PF,) 50 mcg/0.5 mL susr injection; 0.5 mL by IntraMUSCular route once for 1 dose. 0.5ml IM. Repeat in -6 mos. Patient instructed to go to pharmacy for Shingrix series. 5. Postmenopausal state  -     DEXA BONE DENSITY STUDY AXIAL; Future    6. Alcohol dependence in remission [facility])  Patient has done beautifully continuing to abstain from alcohol. She will let me know if she ever feels that she needs intervention in the future. I will continue to monitor as well. 7. Bipolar depression ([facility]  Well-controlled on current dose of Lamictal which she uses for this and history of migraines. 8. Mixed hyperlipidemia  -     atorvastatin (LIPITOR) 20 mg tablet; Take 1 Tablet by mouth every evening.  -     LIPID PANEL  -     METABOLIC PANEL, COMPREHENSIVE  -     CBC WITH AUTOMATED DIFF  Patient reports taking medication daily as directed. We are checking annual level with labs. 9. Sinus drainage  -     montelukast (SINGULAIR) 10 mg tablet; Take 1 Tablet by mouth nightly. I am going to try adding Singulair.   If this does not alleviate symptoms our next step would be ENT referral.       -Pain evaluation performed today  -Cognitive Screen performed today  -Depression Screen, Fall risks (by up and go test)  and ADL functionality were addressed  -Medication list updated and reviewed for any changes   -A comprehensive review of medical issues and a plan was formulated  -End of life planning was addressed with pt   -Health Screenings for preventions were addressed and a plan was formulated  -Discussed with patient cancer risk factors and appropriate screenings for age  -Labs from previous visits were discussed with patient   -Discussed with patient diet and exercise and formulated a plan as needed  -An Advanced care plan was developed with the patient.  -Alcohol screening performed    -    I have discussed the diagnosis with the patient and the intended plan as seen in the above orders. The patient understands and agrees with the plan. The patient has received an after-visit summary and questions were answered concerning future plans. Medication Side Effects and Warnings were discussed with patien  Patient Labs were reviewed and or requested  Patient Past Records were reviewed and or requested    There are no Patient Instructions on file for this visit.       Talisha Swenson MD

## 2022-03-23 LAB
ALBUMIN SERPL-MCNC: 4.7 G/DL (ref 3.7–4.7)
ALBUMIN/GLOB SERPL: 1.8 {RATIO} (ref 1.2–2.2)
ALP SERPL-CCNC: 50 IU/L (ref 44–121)
ALT SERPL-CCNC: 14 IU/L (ref 0–32)
AST SERPL-CCNC: 15 IU/L (ref 0–40)
BASOPHILS # BLD AUTO: 0.1 X10E3/UL (ref 0–0.2)
BASOPHILS NFR BLD AUTO: 1 %
BILIRUB SERPL-MCNC: 0.2 MG/DL (ref 0–1.2)
BUN SERPL-MCNC: 16 MG/DL (ref 8–27)
BUN/CREAT SERPL: 20 (ref 12–28)
CALCIUM SERPL-MCNC: 9.5 MG/DL (ref 8.7–10.3)
CHLORIDE SERPL-SCNC: 103 MMOL/L (ref 96–106)
CHOLEST SERPL-MCNC: 200 MG/DL (ref 100–199)
CO2 SERPL-SCNC: 21 MMOL/L (ref 20–29)
CREAT SERPL-MCNC: 0.8 MG/DL (ref 0.57–1)
EGFR: 77 ML/MIN/1.73
EOSINOPHIL # BLD AUTO: 0.3 X10E3/UL (ref 0–0.4)
EOSINOPHIL NFR BLD AUTO: 7 %
ERYTHROCYTE [DISTWIDTH] IN BLOOD BY AUTOMATED COUNT: 12.9 % (ref 11.7–15.4)
GLOBULIN SER CALC-MCNC: 2.6 G/DL (ref 1.5–4.5)
GLUCOSE SERPL-MCNC: 87 MG/DL (ref 65–99)
HCT VFR BLD AUTO: 41.8 % (ref 34–46.6)
HDLC SERPL-MCNC: 65 MG/DL
HGB BLD-MCNC: 13.8 G/DL (ref 11.1–15.9)
IMM GRANULOCYTES # BLD AUTO: 0 X10E3/UL (ref 0–0.1)
IMM GRANULOCYTES NFR BLD AUTO: 0 %
LDLC SERPL CALC-MCNC: 121 MG/DL (ref 0–99)
LYMPHOCYTES # BLD AUTO: 1.1 X10E3/UL (ref 0.7–3.1)
LYMPHOCYTES NFR BLD AUTO: 27 %
MCH RBC QN AUTO: 31.1 PG (ref 26.6–33)
MCHC RBC AUTO-ENTMCNC: 33 G/DL (ref 31.5–35.7)
MCV RBC AUTO: 94 FL (ref 79–97)
MONOCYTES # BLD AUTO: 0.4 X10E3/UL (ref 0.1–0.9)
MONOCYTES NFR BLD AUTO: 9 %
NEUTROPHILS # BLD AUTO: 2.4 X10E3/UL (ref 1.4–7)
NEUTROPHILS NFR BLD AUTO: 56 %
PLATELET # BLD AUTO: 220 X10E3/UL (ref 150–450)
POTASSIUM SERPL-SCNC: 4.4 MMOL/L (ref 3.5–5.2)
PROT SERPL-MCNC: 7.3 G/DL (ref 6–8.5)
RBC # BLD AUTO: 4.44 X10E6/UL (ref 3.77–5.28)
SODIUM SERPL-SCNC: 141 MMOL/L (ref 134–144)
TRIGL SERPL-MCNC: 77 MG/DL (ref 0–149)
VLDLC SERPL CALC-MCNC: 14 MG/DL (ref 5–40)
WBC # BLD AUTO: 4.3 X10E3/UL (ref 3.4–10.8)

## 2022-04-01 NOTE — PROGRESS NOTES
Results reviewed with pt, copy sent to Dr. Salome Tenorio. Pt is agreeable to increase on cholesterol medication, she is requesting new script be sent to pharmacy.

## 2022-04-02 RX ORDER — ATORVASTATIN CALCIUM 40 MG/1
40 TABLET, FILM COATED ORAL
Qty: 90 TABLET | Refills: 0 | Status: SHIPPED | OUTPATIENT
Start: 2022-04-02 | End: 2022-07-01 | Stop reason: SDUPTHER

## 2022-04-05 ENCOUNTER — HOSPITAL ENCOUNTER (OUTPATIENT)
Dept: MAMMOGRAPHY | Age: 77
Discharge: HOME | End: 2022-04-05
Attending: FAMILY MEDICINE
Payer: MEDICARE

## 2022-04-05 DIAGNOSIS — Z78.0 POSTMENOPAUSAL STATE: ICD-10-CM

## 2022-04-05 PROCEDURE — 77080 DXA BONE DENSITY AXIAL: CPT

## 2022-04-09 DIAGNOSIS — M81.0 AGE-RELATED OSTEOPOROSIS WITHOUT CURRENT PATHOLOGICAL FRACTURE: Primary | ICD-10-CM

## 2022-04-09 RX ORDER — ALENDRONATE SODIUM 70 MG/1
TABLET ORAL
Qty: 12 TABLET | Refills: 3 | Status: SHIPPED | OUTPATIENT
Start: 2022-04-09

## 2022-05-05 ENCOUNTER — VIRTUAL VISIT (OUTPATIENT)
Dept: FAMILY MEDICINE CLINIC | Age: 77
End: 2022-05-05
Payer: MEDICARE

## 2022-05-05 DIAGNOSIS — F31.9 BIPOLAR DEPRESSION (HCC): Primary | ICD-10-CM

## 2022-05-05 DIAGNOSIS — Z79.899 HIGH RISK MEDICATION USE: ICD-10-CM

## 2022-05-05 PROCEDURE — 3017F COLORECTAL CA SCREEN DOC REV: CPT | Performed by: FAMILY MEDICINE

## 2022-05-05 PROCEDURE — G8427 DOCREV CUR MEDS BY ELIG CLIN: HCPCS | Performed by: FAMILY MEDICINE

## 2022-05-05 PROCEDURE — G9717 DOC PT DX DEP/BP F/U NT REQ: HCPCS | Performed by: FAMILY MEDICINE

## 2022-05-05 PROCEDURE — 1101F PT FALLS ASSESS-DOCD LE1/YR: CPT | Performed by: FAMILY MEDICINE

## 2022-05-05 PROCEDURE — 1090F PRES/ABSN URINE INCON ASSESS: CPT | Performed by: FAMILY MEDICINE

## 2022-05-05 PROCEDURE — 99214 OFFICE O/P EST MOD 30 MIN: CPT | Performed by: FAMILY MEDICINE

## 2022-05-05 RX ORDER — CITALOPRAM 40 MG/1
40 TABLET, FILM COATED ORAL DAILY
Qty: 90 TABLET | Refills: 1 | Status: SHIPPED | OUTPATIENT
Start: 2022-05-05 | End: 2022-08-11 | Stop reason: SDUPTHER

## 2022-05-05 RX ORDER — DIVALPROEX SODIUM 500 MG/1
500 TABLET, EXTENDED RELEASE ORAL
Qty: 90 TABLET | Refills: 1 | Status: SHIPPED | OUTPATIENT
Start: 2022-05-05 | End: 2022-11-03

## 2022-05-05 RX ORDER — BUPROPION HYDROCHLORIDE 150 MG/1
150 TABLET ORAL DAILY
Qty: 90 TABLET | Refills: 1 | Status: SHIPPED | OUTPATIENT
Start: 2022-05-05 | End: 2022-10-11

## 2022-05-05 RX ORDER — BUPROPION HYDROCHLORIDE 75 MG/1
TABLET ORAL
Qty: 90 TABLET | Refills: 1 | Status: SHIPPED | OUTPATIENT
Start: 2022-05-05

## 2022-05-05 RX ORDER — GUAIFENESIN 400 MG/1
400 TABLET ORAL DAILY
COMMUNITY

## 2022-05-05 NOTE — PROGRESS NOTES
Consent:  Leopoldo Hand, was evaluated through a synchronous (real-time) audio-video encounter. The patient (or guardian if applicable) is aware that this is a billable service, which includes applicable co-pays. This Virtual Visit was conducted with patient's (and/or legal guardian's) consent. The visit was conducted pursuant to the emergency declaration under the [address] [place] [address] and the [employer] General Act. Patient identification was verified, and a caregiver was present when appropriate. The patient was located in a state where the provider was licensed to provide care. 712  Subjective:   Leopoldo Hand is a 77 y.o. female who was seen for Follow Up Chronic Condition and Other (discuss and cover meds and labs that Dr. Merril Cabot wants done )      Patient has been following with a psychiatrist and psychologist for years for her bipolar depression. She has been well controlled for quite some time now so she asked him both if her primary care doctor could take over management of her medications. She was told by both that this would be reasonable. She is currently taking Celexa 40 mg daily, bupropion  mg daily, bupropion immediate release 75 mg around 2 PM, and Depakote extended release 500 mg once daily. The psychiatrist stated that she needs to have TSH, A1c, and valproic acid levels checked. Patient reports that she has not had any medication changes in the last 6 months with the exception that they did attempt to discontinue her bupropion 75 mg in the afternoon but that did not go well so it was restarted. Prior to Admission medications    Medication Sig Start Date End Date Taking? Authorizing Provider   guaiFENesin (Mucus Relief) 400 mg tablet Take 400 mg by mouth daily. Yes Provider, Historical   potassium 99 mg tablet Take 99 mg by mouth daily.    Yes Provider, Historical   buPROPion [facility]) 75 mg tablet Take 75mg once daily around 2pm 5/5/22  Yes Kristen Zhu MD   buPROPion XL (WELLBUTRIN XL) 150 mg tablet Take 1 Tablet by mouth daily. 5/5/22  Yes Kristen Zhu MD   divalproex ER (DEPAKOTE ER) 500 mg ER tablet Take 1 Tablet by mouth nightly. 5/5/22  Yes Kristen Zhu MD   citalopram (CELEXA) 40 mg tablet Take 1 Tablet by mouth daily. 5/5/22  Yes Kristen Zhu MD   alendronate (FOSAMAX) 70 mg tablet Take one tab weekly in the morning at least 30 minutes before the first food, beverage (except plain water), or other medications. 4/9/22  Yes Kristen Zhu MD   atorvastatin (LIPITOR) 40 mg tablet Take 1 Tablet by mouth nightly. 4/2/22  Yes Kristen Zhu MD   montelukast (SINGULAIR) 10 mg tablet Take 1 Tablet by mouth nightly. 3/22/22  Yes Kristen Zhu MD   topiramate (TOPAMAX) 100 mg tablet TAKE 1 TABLET BY MOUTH DAILY 10/31/21  Yes Kristen Zhu MD   fluticasone propionate (FLONASE) 50 mcg/actuation nasal spray 2 Sprays by Both Nostrils route daily. Yes Provider, Historical   cetirizine (ZyrTEC) 10 mg tablet Take 10 mg by mouth daily. Yes Provider, Historical   buPROPion SR (WELLBUTRIN SR) 150 mg SR tablet 150 mg two (2) times a day. 1/13/21 5/5/22  Provider, Historical   citalopram (CELEXA) 40 mg tablet Take 40 mg by mouth daily. 5/5/22  Provider, Historical   divalproex ER (DEPAKOTE ER) 500 mg ER tablet Take 500 mg by mouth daily.  8/25/20 5/5/22  Provider, Historical     Allergies   Allergen Reactions    Cefuroxime Unknown (comments)    Cortisone Unknown (comments)    Gabapentin Unknown (comments)    Hydrocodone Nausea Only    Imitrex [Sumatriptan] Unknown (comments)    Lyrica [Pregabalin] Unknown (comments)    Nyquil [Doxylamin-Pse-Dm-Acetaminophen] Unknown (comments)    Oxycodone Unknown (comments)    Peanut Unknown (comments)    Seroquel [Quetiapine] Nausea and Vomiting    Tramadol Unknown (comments)    Tylenol [Acetaminophen] Unknown (comments)     Patient Active Problem List    Diagnosis    [facility] ([facility]    ALBERT (generalized anxiety disorder)    [facility]     Last drink was 11/9/2019      Allergic rhinitis    Benign adenomatous neoplasm    Degeneration, intervertebral disc, lumbar    Depressive disorder    Family history of diabetes mellitus    Family history of thyroid disorder    Fibromyalgia    Mixed hyperlipidemia    Insomnia    Migraine with aura and without status migrainosus, not intractable    Neuropathy    Osteoporosis    Small fiber neuropathy    Lumbosacral spondylosis without myelopathy    Displacement of lumbar intervertebral disc without myelopathy    Leg length inequality    Lumbar post-laminectomy syndrome    Lumbosacral radiculitis    Sacroiliac joint pain    Trochanteric bursitis    Hydronephrosis    Microscopic hematuria    Allergic rhinitis due to pollen       Objective:   Vital Signs: (As obtained by patient/caregiver at home)  There were no vitals taken for this visit.      [INSTRUCTIONS:  \"[x]\" Indicates a positive item  \"[]\" Indicates a negative item  -- DELETE ALL ITEMS NOT EXAMINED]    Constitutional: [x] Appears well-developed and well-nourished [x] No apparent distress      [] Abnormal -     Mental status: [x] Alert and awake  [x] Oriented to person/place/time [x] Able to follow commands    [] Abnormal -     Eyes:   EOM    [x]  Normal    [] Abnormal -   Sclera  [x]  Normal    [] Abnormal -          Discharge [x]  None visible   [] Abnormal -     HENT: [x] Normocephalic, atraumatic  [] Abnormal -   [] Mouth/Throat: Mucous membranes are moist    External Ears [] Normal  [] Abnormal -    Neck: [x] No visualized mass [] Abnormal -     Pulmonary/Chest: [x] Respiratory effort normal   [x] No visualized signs of difficulty breathing or respiratory distress        [] Abnormal -        Neurological:        [x] No Facial Asymmetry (Cranial nerve 7 motor function) (limited exam due to video visit)          [x] No gaze palsy        [] Abnormal -          Skin:        [x] No significant exanthematous lesions or discoloration noted on facial skin         [] Abnormal -            Psychiatric:       [x] Normal Affect [] Abnormal -        [x] No Hallucinations    Other pertinent observable physical exam findings:-              Assessment & Plan:   Diagnoses and all orders for this visit:    1. Bipolar depression ([address]  -     buPROPion (WELLBUTRIN) 75 mg tablet; Take 75mg once daily around 2pm  -     buPROPion XL (WELLBUTRIN XL) 150 mg tablet; Take 1 Tablet by mouth daily. -     divalproex ER (DEPAKOTE ER) 500 mg ER tablet; Take 1 Tablet by mouth nightly. -     citalopram (CELEXA) 40 mg tablet; Take 1 Tablet by mouth daily.  -     THYROID CASCADE PROFILE  -     HEMOGLOBIN A1C WITH EAG  -     VALPROIC ACID    2. High risk medication use  -     THYROID CASCADE PROFILE  -     HEMOGLOBIN A1C WITH EAG  -     VALPROIC ACID        I am agreeable to taking over management of these medications since patient has been doing very well for such an extended period of time. If we do decide to cut back medication at some point we will start with the Celexa since weaning back on the bupropion was unsuccessful at last attempt. I will see her back every 6 months and as needed. We discussed the expected course, resolution and complications of the diagnosis(es) in detail. Medication risks, benefits, costs, interactions, and alternatives were discussed as indicated. I advised her to contact the office if her condition worsens, changes or fails to improve as anticipated. She expressed understanding with the diagnosis(es) and plan. Nelsy Miller is a 77 y.o. female being evaluated by a video visit encounter for concerns as above. A caregiver was present when appropriate.  Due to this being a TeleHealth encounter (During FEZNW-90 public health emergency), evaluation of the following organ systems was limited: Vitals/Constitutional/EENT/Resp/CV/GI//MS/Neuro/Skin/Heme-Lymph-Imm. Pursuant to the emergency declaration under the [address] [place] [address], [facility] waiver authority and the [employer] Act, this Virtual  Visit was conducted, with patient's (and/or legal guardian's) consent, to reduce the patient's risk of exposure to COVID-19 and provide necessary medical care. Services were provided through a video synchronous discussion virtually to substitute for in-person clinic visit. Patient and provider were located at their individual homes.         Shefali Espinosa MD

## 2022-05-05 NOTE — PROGRESS NOTES
Chief Complaint   Patient presents with    Follow Up Chronic Condition    Other     discuss and cover meds and labs that Dr. Bee Ramírez wants done      1. Have you been to the ER, urgent care clinic since your last visit? Hospitalized since your last visit? No    2. Have you seen or consulted any other health care providers outside of the [address] [place] [address] since your last visit? Include any pap smears or colon screening.  Patient has seen Dr. Bee Ramírez     Patient would like to use # [phone] for her visit today

## 2022-05-06 LAB
EST. AVERAGE GLUCOSE BLD GHB EST-MCNC: 111 MG/DL
HBA1C MFR BLD: 5.5 % (ref 4.8–5.6)
TSH SERPL DL<=0.005 MIU/L-ACNC: 2.23 UIU/ML (ref 0.45–4.5)
VALPROATE SERPL-MCNC: <4 UG/ML (ref 50–100)

## 2022-05-12 ENCOUNTER — TELEPHONE (OUTPATIENT)
Dept: FAMILY MEDICINE CLINIC | Age: 77
End: 2022-05-12

## 2022-05-12 NOTE — TELEPHONE ENCOUNTER
Pt stated that her Spinal Cord Implant battery is dead and that she will not be able to see Dr. Marilee Wilson until 6/13/2022. She stated that she is not able to take OTC med. She stated that she is in so much pain that she is only able to sit on one side of her buttocks and that Selena Cast is walking like she has soiled her pants. \" She stated that she does not want to take too much of her THC. She stated that she would like a call back.

## 2022-05-12 NOTE — TELEPHONE ENCOUNTER
Unfortunately this is not something that we treat from our office. I would recommend that she call Dr. Chuy Weiss office to let them know the level of pain she is experiencing. Hopefully they would be able to get her in sooner but if they cannot, perhaps they would recommend a pain management specialist that they work with or they may even have her go to the ER for more urgent treatment.

## 2022-05-12 NOTE — TELEPHONE ENCOUNTER
Patient notified and states that she will call Dr. Isaiah Daugherty office and she what that can do.

## 2022-06-30 RX ORDER — ATORVASTATIN CALCIUM 20 MG/1
TABLET, FILM COATED ORAL
Qty: 90 TABLET | Refills: 1 | OUTPATIENT
Start: 2022-06-30

## 2022-07-02 RX ORDER — ATORVASTATIN CALCIUM 40 MG/1
40 TABLET, FILM COATED ORAL
Qty: 90 TABLET | Refills: 0 | Status: SHIPPED | OUTPATIENT
Start: 2022-07-02 | End: 2022-09-27

## 2022-07-14 RX ORDER — TOPIRAMATE 100 MG/1
TABLET, FILM COATED ORAL
Qty: 90 TABLET | Refills: 2 | Status: SHIPPED | OUTPATIENT
Start: 2022-07-14 | End: 2022-08-04 | Stop reason: SDUPTHER

## 2022-08-04 ENCOUNTER — TELEPHONE (OUTPATIENT)
Dept: FAMILY MEDICINE CLINIC | Age: 77
End: 2022-08-04

## 2022-08-04 RX ORDER — TOPIRAMATE 100 MG/1
100 TABLET, FILM COATED ORAL 2 TIMES DAILY
Qty: 180 TABLET | Refills: 0 | Status: SHIPPED | OUTPATIENT
Start: 2022-08-04 | End: 2022-08-06

## 2022-08-04 NOTE — TELEPHONE ENCOUNTER
----- Message from [place] [facility] [place] sent at 8/4/2022 10:46 AM EDT -----  Subject: Message to Provider    QUESTIONS  Information for Provider? Pt states her migraines have started back up but   not as painful and this time the pain is across the front of her head. Wants to know if her prescription for opiramate (TOPAMAX) 100 mg dosage   could be raised, please advise.   ---------------------------------------------------------------------------  --------------  Patrice Galvez [id number]  [phone]; OK to leave message on voicemail  ---------------------------------------------------------------------------  --------------  SCRIPT ANSWERS  Relationship to Patient?  Self

## 2022-08-06 RX ORDER — TOPIRAMATE 100 MG/1
TABLET, FILM COATED ORAL
Qty: 180 TABLET | Refills: 0 | Status: SHIPPED | OUTPATIENT
Start: 2022-08-06

## 2022-08-11 DIAGNOSIS — F31.9 BIPOLAR DEPRESSION (HCC): ICD-10-CM

## 2022-08-11 NOTE — TELEPHONE ENCOUNTER
Reason for call:  Pt is completely out of Citalopram. She needs this medication filled today at her pharmacy.     [facility] #[id number] - [address] [facility]  [phone]    Is this a new problem: yes     Date of last appointment:  5/5/2022     Can we respond via [employer]: no    Best call back number: [phone]

## 2022-08-12 RX ORDER — CITALOPRAM 40 MG/1
40 TABLET, FILM COATED ORAL DAILY
Qty: 90 TABLET | Refills: 1 | OUTPATIENT
Start: 2022-08-12

## 2022-09-17 DIAGNOSIS — J34.89 SINUS DRAINAGE: ICD-10-CM

## 2022-09-18 RX ORDER — MONTELUKAST SODIUM 10 MG/1
TABLET ORAL
Qty: 90 TABLET | Refills: 2 | Status: SHIPPED | OUTPATIENT
Start: 2022-09-18

## 2022-09-19 RX ORDER — CITALOPRAM 40 MG/1
40 TABLET, FILM COATED ORAL DAILY
Qty: 90 TABLET | Refills: 2 | Status: SHIPPED | OUTPATIENT
Start: 2022-09-19

## 2022-09-19 NOTE — TELEPHONE ENCOUNTER
Reason for call: Pt calling--she is needing her Citalopram 40mg refilled to her pharmacy.     [facility] #[id number] - [address] [facility]  [phone]    Is this a new problem: yes     Date of last appointment:  5/5/2022     Can we respond via [employer]: no    Best call back number: [phone]

## 2022-10-10 DIAGNOSIS — F31.9 BIPOLAR DEPRESSION (HCC): ICD-10-CM

## 2022-10-11 RX ORDER — BUPROPION HYDROCHLORIDE 150 MG/1
150 TABLET ORAL DAILY
Qty: 90 TABLET | Refills: 1 | Status: SHIPPED | OUTPATIENT
Start: 2022-10-11

## 2022-10-24 ENCOUNTER — TELEPHONE (OUTPATIENT)
Dept: FAMILY MEDICINE CLINIC | Age: 77
End: 2022-10-24

## 2022-10-24 RX ORDER — PROMETHAZINE HYDROCHLORIDE 25 MG/1
25 TABLET ORAL
Qty: 60 TABLET | Refills: 0 | Status: SHIPPED | OUTPATIENT
Start: 2022-10-24

## 2022-10-24 NOTE — TELEPHONE ENCOUNTER
Patient tested positive for Covid on Sunday symptoms began last Monday. Patient is experiencing a great deal of nausea and is requesting medication for this.

## 2022-11-03 ENCOUNTER — VIRTUAL VISIT (OUTPATIENT)
Dept: FAMILY MEDICINE CLINIC | Age: 77
End: 2022-11-03
Payer: MEDICARE

## 2022-11-03 DIAGNOSIS — F31.9 BIPOLAR DEPRESSION (HCC): Primary | ICD-10-CM

## 2022-11-03 DIAGNOSIS — M54.50 ACUTE MIDLINE LOW BACK PAIN WITHOUT SCIATICA: ICD-10-CM

## 2022-11-03 DIAGNOSIS — F31.9 BIPOLAR DEPRESSION (HCC): ICD-10-CM

## 2022-11-03 PROCEDURE — 1090F PRES/ABSN URINE INCON ASSESS: CPT | Performed by: FAMILY MEDICINE

## 2022-11-03 PROCEDURE — 99213 OFFICE O/P EST LOW 20 MIN: CPT | Performed by: FAMILY MEDICINE

## 2022-11-03 PROCEDURE — 1123F ACP DISCUSS/DSCN MKR DOCD: CPT | Performed by: FAMILY MEDICINE

## 2022-11-03 PROCEDURE — G9717 DOC PT DX DEP/BP F/U NT REQ: HCPCS | Performed by: FAMILY MEDICINE

## 2022-11-03 PROCEDURE — 1101F PT FALLS ASSESS-DOCD LE1/YR: CPT | Performed by: FAMILY MEDICINE

## 2022-11-03 PROCEDURE — G8427 DOCREV CUR MEDS BY ELIG CLIN: HCPCS | Performed by: FAMILY MEDICINE

## 2022-11-03 RX ORDER — DIVALPROEX SODIUM 500 MG/1
TABLET, EXTENDED RELEASE ORAL
Qty: 90 TABLET | Refills: 1 | Status: SHIPPED | OUTPATIENT
Start: 2022-11-03

## 2022-11-03 NOTE — PROGRESS NOTES
Consent:  Mile Banks, was evaluated through a synchronous (real-time) audio-video encounter. The patient (or guardian if applicable) is aware that this is a billable service, which includes applicable co-pays. This Virtual Visit was conducted with patient's (and/or legal guardian's) consent. The visit was conducted pursuant to the emergency declaration under the [address] [place] [address] and the [employer] Act. Patient identification was verified, and a caregiver was present when appropriate. The patient was located in a state where the provider was licensed to provide care. 712  Subjective:   Mile Banks is a 69 y.o. female who was seen for Follow Up Chronic Condition and LOW BACK PAIN (Patient had covid on 10/17/22 and fell and back of head and lower back due to being sick [facility] , but still having back pain patient states when she moves it is about an 8 )    Patient is following up on bipolar as well as discussing the recent fall noted on intake note. For the bipolar, she reports doing very well with her moods currently. She is dating a new  in the last 3 to 4 months who lives in [place]. Moods were doing well prior to that and now are even better. The COVID infection occurred a few weeks ago as noted above. She reports that the COVID symptoms have fully resolved but during the course of the infection, she got up during the middle of the night and had a fall. She remembers very little from the incident but knows that she got herself back into bed. She had a very superficial cut on the back of her head but more significantly had pain in the lower back at the midline. [facility] but [facility] did not do any intervention for the fall, head, or back. They tested her for COVID and did some basic labs.   She does report that the back pain is improving significantly. She has not had any neurologic symptoms including slurred speech, dizziness, weakness, etc.  No additional falls since that night. As for the back pain itself, she denies any radiation of the pain, saddle numbness, or bowel incontinence. No fevers. Prior to Admission medications    Medication Sig Start Date End Date Taking? Authorizing Provider   promethazine (PHENERGAN) 25 mg tablet Take 1 Tablet by mouth every six (6) hours as needed for Nausea. 10/24/22  Yes Sam Reese MD   buPROPion XL (WELLBUTRIN XL) 150 mg tablet TAKE 1 TABLET BY MOUTH DAILY 10/11/22  Yes Sam Reese MD   atorvastatin (LIPITOR) 40 mg tablet TAKE 1 TABLET BY MOUTH EVERY NIGHT 9/27/22  Yes Sam Reese MD   citalopram (CELEXA) 40 mg tablet Take 1 Tablet by mouth daily. 9/19/22  Yes Sam Reese MD   montelukast (SINGULAIR) 10 mg tablet TAKE 1 TABLET BY MOUTH EVERY NIGHT 9/18/22  Yes Sam Reese MD   topiramate (TOPAMAX) 100 mg tablet TAKE 1 TABLET BY MOUTH TWICE DAILY 8/6/22  Yes Sam Reese MD   guaiFENesin (ORGANIDIN) 400 mg tablet Take 400 mg by mouth daily. Yes Provider, Historical   potassium 99 mg tablet Take 99 mg by mouth daily. Yes Provider, Historical   buPROPion (WELLBUTRIN) 75 mg tablet Take 75mg once daily around 2pm 5/5/22  Yes Sam Reese MD   divalproex ER (DEPAKOTE ER) 500 mg ER tablet Take 1 Tablet by mouth nightly. 5/5/22  Yes Sam Reese MD   alendronate (FOSAMAX) 70 mg tablet Take one tab weekly in the morning at least 30 minutes before the first food, beverage (except plain water), or other medications. 4/9/22  Yes Sam Reese MD   fluticasone propionate (FLONASE) 50 mcg/actuation nasal spray 2 Sprays by Both Nostrils route daily. Yes Provider, Historical   cetirizine (ZYRTEC) 10 mg tablet Take 10 mg by mouth daily.    Yes Provider, Historical     Allergies   Allergen Reactions    Cefuroxime Unknown (comments)    Cortisone Unknown (comments)    Gabapentin Unknown (comments)    Hydrocodone Nausea Only    Imitrex [Sumatriptan] Unknown (comments)    Lyrica [Pregabalin] Unknown (comments)    Nyquil [Doxylamin-Pse-Dm-Acetaminophen] Unknown (comments)    Oxycodone Unknown (comments)    Peanut Unknown (comments)    Seroquel [Quetiapine] Nausea and Vomiting    Tramadol Unknown (comments)    Tylenol [Acetaminophen] Unknown (comments)     Patient Active Problem List    Diagnosis    [facility] ([facility]     As of 5/5/22 I will be managing this as opposed to Dr. Che Cortez. We will consider at next visit titrating back on celexa. Attempt to d/c the low dose bupropion was unsuccessful. ALBERT (generalized anxiety disorder)    Alcohol dependence ([facility]     Last drink was 11/9/2019      Allergic rhinitis    Benign adenomatous neoplasm    Degeneration, intervertebral disc, lumbar    Depressive disorder    Family history of diabetes mellitus    Family history of thyroid disorder    Fibromyalgia    Mixed hyperlipidemia    Insomnia    Migraine with aura and without status migrainosus, not intractable    Neuropathy    Osteoporosis    Small fiber neuropathy    Lumbosacral spondylosis without myelopathy    Displacement of lumbar intervertebral disc without myelopathy    Leg length inequality    Lumbar post-laminectomy syndrome    Lumbosacral radiculitis    Sacroiliac joint pain    Trochanteric bursitis    Hydronephrosis    Microscopic hematuria    Allergic rhinitis due to pollen       Objective:   Vital Signs: (As obtained by patient/caregiver at home)  There were no vitals taken for this visit.      [INSTRUCTIONS:  \"[x]\" Indicates a positive item  \"[]\" Indicates a negative item  -- DELETE ALL ITEMS NOT EXAMINED]    Constitutional: [x] Appears well-developed and well-nourished [x] No apparent distress      [] Abnormal -     Mental status: [x] Alert and awake  [x] Oriented to person/place/time [x] Able to follow commands [] Abnormal -     Eyes:   EOM    [x]  Normal    [] Abnormal -   Sclera  [x]  Normal    [] Abnormal -          Discharge [x]  None visible   [] Abnormal -     HENT: [x] Normocephalic, atraumatic  [] Abnormal -   [] Mouth/Throat: Mucous membranes are moist    External Ears [] Normal  [] Abnormal -    Neck: [x] No visualized mass [] Abnormal -     Pulmonary/Chest: [x] Respiratory effort normal   [x] No visualized signs of difficulty breathing or respiratory distress        [] Abnormal -        Neurological:        [x] No Facial Asymmetry (Cranial nerve 7 motor function) (limited exam due to video visit)          [x] No gaze palsy        [] Abnormal -          Skin:        [x] No significant exanthematous lesions or discoloration noted on facial skin         [] Abnormal -            Psychiatric:       [x] Normal Affect [] Abnormal -        [x] No Hallucinations    Other pertinent observable physical exam findings:-              Assessment & Plan:   Diagnoses and all orders for this visit:    1. Bipolar depression ([facility]  We will continue current medication regimen but I am giving her permission to try cutting her citalopram in half from 40 mg to 20 mg if desired. I will see her back when due for her annual wellness in late March. 2. Acute midline low back pain without sciatica  Since symptoms are improving naturally, we will hold off on any other work-up at this time but recommend heat pads, plenty of stretching, and massage. Avoid straining or heavy lifting until fully resolved. Notify the office if symptoms are not continuing to improve over the upcoming weeks. Follow-up and Dispositions    Return in about 20 weeks (around 3/23/2023) for [address], f/u- ideally fast.           We discussed the expected course, resolution and complications of the diagnosis(es) in detail. Medication risks, benefits, costs, interactions, and alternatives were discussed as indicated.   I advised her to contact the office if her condition worsens, changes or fails to improve as anticipated. She expressed understanding with the diagnosis(es) and plan. Ajay John is a 69 y.o. female being evaluated by a video visit encounter for concerns as above. A caregiver was present when appropriate. Due to this being a TeleHealth encounter (During JYFKU-25 public [facility] emergency), evaluation of the following organ systems was limited: Vitals/Constitutional/EENT/Resp/CV/GI//MS/Neuro/Skin/Heme-Lymph-Imm. Pursuant to the emergency declaration under the [address] [place] [address], [facility] waiver authority and the [employer] Act, this Virtual  Visit was conducted, with patient's (and/or legal guardian's) consent, to reduce the patient's risk of exposure to COVID-19 and provide necessary medical care. Services were provided through a video synchronous discussion virtually to substitute for in-person clinic visit. Patient and provider were located at their individual homes. Aspects of this note have been generated using voice recognition software. Despite editing, there may be some syntax errors.     Caroline Parker MD

## 2022-11-03 NOTE — PROGRESS NOTES
Chief Complaint   Patient presents with    Follow Up Chronic Condition    LOW BACK PAIN     Patient had covid on 10/17/22 and fell and back of head and lower back due to being sick [facility]22 , but still having back pain patient states when she moves it is about an 8      1. Have you been to the ER, urgent care clinic since your last visit? Hospitalized since your last visit? Yes, [facility] for covid and a fall     2. Have you seen or consulted any other health care providers outside of the [address] [place] [address] since your last visit? Include any pap smears or colon screening.  No    Patient would like to use # [phone] for her visit today

## 2022-12-11 DIAGNOSIS — M81.0 AGE-RELATED OSTEOPOROSIS WITHOUT CURRENT PATHOLOGICAL FRACTURE: ICD-10-CM

## 2022-12-11 RX ORDER — ALENDRONATE SODIUM 70 MG/1
TABLET ORAL
Qty: 12 TABLET | Refills: 3 | Status: SHIPPED | OUTPATIENT
Start: 2022-12-11

## 2023-01-07 RX ORDER — ATORVASTATIN CALCIUM 40 MG/1
TABLET, FILM COATED ORAL
Qty: 90 TABLET | Refills: 1 | Status: SHIPPED | OUTPATIENT
Start: 2023-01-07

## 2023-02-19 DIAGNOSIS — F31.9 BIPOLAR DEPRESSION (HCC): ICD-10-CM

## 2023-02-19 RX ORDER — BUPROPION HYDROCHLORIDE 75 MG/1
TABLET ORAL
Qty: 90 TABLET | Refills: 1 | Status: SHIPPED | OUTPATIENT
Start: 2023-02-19

## 2023-03-02 RX ORDER — PROMETHAZINE HYDROCHLORIDE 25 MG/1
TABLET ORAL
Qty: 60 TABLET | Refills: 0 | Status: SHIPPED | OUTPATIENT
Start: 2023-03-02

## 2023-05-25 RX ORDER — DIVALPROEX SODIUM 500 MG/1
TABLET, EXTENDED RELEASE ORAL
Qty: 90 TABLET | Refills: 1 | Status: SHIPPED | OUTPATIENT
Start: 2023-05-25

## 2023-06-07 ENCOUNTER — TELEPHONE (OUTPATIENT)
Facility: CLINIC | Age: 78
End: 2023-06-07

## 2023-06-07 DIAGNOSIS — E78.2 MIXED HYPERLIPIDEMIA: Primary | ICD-10-CM

## 2023-06-07 NOTE — TELEPHONE ENCOUNTER
Spoke to pt and she reported that she is requesting the blood work to be drawn since the cholesterol medication had been d/c and because she cancelled her appt in March.  Please advise

## 2023-06-07 NOTE — TELEPHONE ENCOUNTER
----- Message from Chaka Meyer sent at 6/7/2023  3:08 PM EDT -----  Subject: Referral Request    Reason for referral request? bloodwork  Provider patient wants to be referred to(if known):     Provider Phone Number(if known): Additional Information for Provider? pt unsure if she needs to complete   bloodwork for her appt w/ Dr. Dieudonne Youngblood on 6/15. This is a virtual so she   was needing advisement on how to complete if needed.  Please follow up  ---------------------------------------------------------------------------  --------------  [id number] uTaP    [phone]; OK to leave message on voicemail  ---------------------------------------------------------------------------  --------------

## 2023-06-15 PROBLEM — F31.9 BIPOLAR DEPRESSION (HCC): Status: ACTIVE | Noted: 2020-09-21

## 2023-06-27 RX ORDER — BUPROPION HYDROCHLORIDE 75 MG/1
TABLET ORAL
Qty: 60 TABLET | OUTPATIENT
Start: 2023-06-27

## 2023-07-16 RX ORDER — BUPROPION HYDROCHLORIDE 150 MG/1
150 TABLET ORAL DAILY
Qty: 90 TABLET | Refills: 2 | Status: SHIPPED | OUTPATIENT
Start: 2023-07-16

## 2024-01-04 RX ORDER — CITALOPRAM 40 MG/1
40 TABLET ORAL DAILY
Qty: 90 TABLET | Refills: 2 | Status: SHIPPED | OUTPATIENT
Start: 2024-01-04

## 2025-01-10 ENCOUNTER — TELEPHONE (OUTPATIENT)
Facility: CLINIC | Age: 80
End: 2025-01-10

## 2025-01-10 NOTE — TELEPHONE ENCOUNTER
Patient called back and I received clarification that she \"stated she did not cancel her appt\" patient was very emotional about her appt possibly being cancelled--assured patient that her appt was not cancelled and she is still on to see Dr. ABAD on 03/06/25 at 8:00AM for a VV.

## 2025-01-10 NOTE — TELEPHONE ENCOUNTER
Called the patient to get clarification if she wants to have her appt cancelled from 03/06/25 at 8:00AM--unable to reach patient---LMTCB.

## 2025-01-10 NOTE — TELEPHONE ENCOUNTER
----- Message from Alexandro DAVIS sent at 1/10/2025 10:27 AM EST -----  Regarding: ECC Message to Provider  ECC Message to Provider    Relationship to Patient: Self     Additional Information patient is requesting for cancellation for her appt on 3/6/25  --------------------------------------------------------------------------------------------------------------------------    Call Back Information: OK to leave message on voicemail  Preferred Call Back Number: Phone [phone]

## 2025-02-12 ENCOUNTER — TELEPHONE (OUTPATIENT)
Facility: CLINIC | Age: 80
End: 2025-02-12

## 2025-02-12 NOTE — TELEPHONE ENCOUNTER
Patient is calling to inform that she needs an injection for osteoporosis and she has a spot on the left side of her adrenal gland and she is going to get it checked out. She states she is going to see an Endocrinologist. She is currently in [facility] and would it be best for her to have her testing done there or come here. She was seeing another provider Olivier Vaca at [facility] and had a bad experience and would like to come back to see you as her provider. I informed the patient that she would need to schedule an appt to see Dr. ABAD to discuss about her injection and address her concerns---she can be reached at [phone]. She informs she was able to take Adderall 10MG and it helped her tremendously, and since she has not been able to take it her words are not coming out clearly and she is having a tough time making sense of what she is trying to convey.     She is scheduled for a future appt:

## 2025-02-19 ENCOUNTER — TELEMEDICINE (OUTPATIENT)
Facility: CLINIC | Age: 80
End: 2025-02-19

## 2025-02-19 DIAGNOSIS — F41.9 ANXIETY: Primary | ICD-10-CM

## 2025-02-19 DIAGNOSIS — F31.9 BIPOLAR DEPRESSION (HCC): ICD-10-CM

## 2025-02-19 DIAGNOSIS — Z87.898 HISTORY OF ALCOHOL USE DISORDER: ICD-10-CM

## 2025-02-19 RX ORDER — BUSPIRONE HYDROCHLORIDE 5 MG/1
5 TABLET ORAL 3 TIMES DAILY
COMMUNITY
Start: 2024-10-01

## 2025-02-19 RX ORDER — PROPRANOLOL HYDROCHLORIDE 80 MG/1
80 CAPSULE, EXTENDED RELEASE ORAL DAILY
COMMUNITY
Start: 2024-07-18 | End: 2025-07-18

## 2025-02-19 RX ORDER — DEXTROAMPHETAMINE SACCHARATE, AMPHETAMINE ASPARTATE MONOHYDRATE, DEXTROAMPHETAMINE SULFATE AND AMPHETAMINE SULFATE 5; 5; 5; 5 MG/1; MG/1; MG/1; MG/1
20 CAPSULE, EXTENDED RELEASE ORAL
COMMUNITY
Start: 2024-11-27

## 2025-02-19 SDOH — ECONOMIC STABILITY: FOOD INSECURITY: WITHIN THE PAST 12 MONTHS, YOU WORRIED THAT YOUR FOOD WOULD RUN OUT BEFORE YOU GOT MONEY TO BUY MORE.: PATIENT DECLINED

## 2025-02-19 SDOH — ECONOMIC STABILITY: FOOD INSECURITY: WITHIN THE PAST 12 MONTHS, THE FOOD YOU BOUGHT JUST DIDN'T LAST AND YOU DIDN'T HAVE MONEY TO GET MORE.: PATIENT DECLINED

## 2025-02-19 ASSESSMENT — PATIENT HEALTH QUESTIONNAIRE - PHQ9
SUM OF ALL RESPONSES TO PHQ QUESTIONS 1-9: 2
SUM OF ALL RESPONSES TO PHQ9 QUESTIONS 1 & 2: 2
1. LITTLE INTEREST OR PLEASURE IN DOING THINGS: SEVERAL DAYS
2. FEELING DOWN, DEPRESSED OR HOPELESS: SEVERAL DAYS

## 2025-02-19 NOTE — ASSESSMENT & PLAN NOTE
See above.  In addition, she guarantees for safety and states that she would never drink alcohol this point.

## 2025-02-19 NOTE — PROGRESS NOTES
\"Have you been to the ER, urgent care clinic since your last visit?  Hospitalized since your last visit?\"    NO    “Have you seen or consulted any other health care providers outside our system since your last visit?”    NO  There were no vitals taken for this visit.  Chief Complaint   Patient presents with    Follow-up Chronic Condition    Anxiety     PHQ-9 Total Score: 2 (2025  8:55 AM)        No questionnaires available.                             Click Here for Release of Records Request       Identified Patient with 2 Patient Identifiers-Name and   
(Cranial nerve 7 motor function) (limited exam due to video visit)          [x] No gaze palsy        [] Abnormal -          Skin:        [x] No significant exanthematous lesions or discoloration noted on facial skin         [] Abnormal -            Psychiatric:       [x] Normal Affect [x] Abnormal -anxious       [x] No Hallucinations    Other pertinent observable physical exam findings:-              Assessment & Plan:     Assessment & Plan  Anxiety  Patient is visibly anxious.  I reviewed with her that many of her medications for mental health cannot be increased any further with the exception of her buspirone.  She states the buspirone has not really helped her.  Truly, she does need the help of a psychiatrist as her mental health needs are beyond the scope of primary care.  In that case she requests a referral but states that she would like somebody near where she is currently living in [place], [place].  Due to my location, I am not familiar with specialist in that area.  I am recommending to her that she is scheduled with a primary care provider in that area.  If she chooses a [place] location, she could enter as an established patient with her current chart system.  They would be more versed to let her know he was in the area.  She reports understanding and expresses gratitude.       Bipolar depression (HCC)  See above.  In addition, she guarantees for safety and states that she would never drink alcohol this point.       History of alcohol use disorder  Last drink 2019.         No follow-ups on file.    We discussed the expected course, resolution and complications of the diagnosis(es) in detail.  Medication risks, benefits, costs, interactions, and alternatives were discussed as indicated.  I advised her to contact the office if her condition worsens, changes or fails to improve as anticipated. She expressed understanding with the diagnosis(es) and plan.       Maggie Marcus is a 79 y.o. female being